# Patient Record
Sex: FEMALE | Race: WHITE | Employment: FULL TIME | URBAN - METROPOLITAN AREA
[De-identification: names, ages, dates, MRNs, and addresses within clinical notes are randomized per-mention and may not be internally consistent; named-entity substitution may affect disease eponyms.]

---

## 2022-01-23 ENCOUNTER — HOSPITAL ENCOUNTER (OUTPATIENT)
Age: 37
Discharge: HOME | End: 2022-01-23
Attending: OBSTETRICS & GYNECOLOGY | Admitting: OBSTETRICS & GYNECOLOGY
Payer: COMMERCIAL

## 2022-01-23 VITALS
RESPIRATION RATE: 16 BRPM | BODY MASS INDEX: 38.14 KG/M2 | TEMPERATURE: 98.3 F | HEIGHT: 61 IN | SYSTOLIC BLOOD PRESSURE: 128 MMHG | WEIGHT: 202 LBS | HEART RATE: 97 BPM | DIASTOLIC BLOOD PRESSURE: 86 MMHG

## 2022-01-23 PROBLEM — Z34.90 PREGNANT: Status: ACTIVE | Noted: 2022-01-23

## 2022-01-23 LAB
APPEARANCE UR: CLEAR
BILIRUB UR QL: NEGATIVE
COLOR UR: YELLOW
GLUCOSE UR QL STRIP.AUTO: NEGATIVE MG/DL
KETONES UR-MCNC: NEGATIVE MG/DL
LEUKOCYTE ESTERASE UR QL STRIP: NEGATIVE
NITRITE UR QL: NEGATIVE
PH UR: 5.5 [PH] (ref 5–9)
PROT UR QL: NEGATIVE MG/DL
RBC # UR STRIP: ABNORMAL /UL
SERVICE CMNT-IMP: ABNORMAL
SP GR UR: 1.02 (ref 1–1.02)
UROBILINOGEN UR QL: 0.2 EU/DL (ref 0.2–1)

## 2022-01-23 PROCEDURE — 59025 FETAL NON-STRESS TEST: CPT

## 2022-01-23 PROCEDURE — 81003 URINALYSIS AUTO W/O SCOPE: CPT

## 2022-01-23 PROCEDURE — 99282 EMERGENCY DEPT VISIT SF MDM: CPT

## 2022-01-23 RX ORDER — LEVOTHYROXINE SODIUM 112 UG/1
TABLET ORAL
COMMUNITY

## 2022-01-23 NOTE — DISCHARGE INSTRUCTIONS
Patient Education   Patient Education   Patient Education        Post-Term Pregnancy: Care Instructions  Overview     Most pregnancies last 37 to 42 weeks. Your pregnancy is post-term (or post-date) when you are at 43 or more weeks. When you get to 40 weeks, your doctor will look at your health and the baby's health and decide whether to wait for natural labor. You may have tests to make sure everything is okay. If you and the baby have no problems, you may be told to wait for natural labor. But if the doctor thinks it would be safer for you and your baby if you deliver sooner rather than later, then you may get medicine to start (induce) your labor. If the medicine doesn't start your labor or labor doesn't keep going, you may have a  section (). This is surgery to deliver the baby. Follow-up care is a key part of your treatment and safety. Be sure to make and go to all appointments, and call your doctor if you are having problems. It's also a good idea to know your test results and keep a list of the medicines you take. How can you care for yourself at home? · Follow your doctor's directions for activity while at home. · Get lots of rest, eat well, and make sure to drink plenty of fluids. When should you call for help? Call 911 anytime you think you may need emergency care. For example, call if:    · You passed out (lost consciousness).     · You have severe vaginal bleeding. Call your doctor now or seek immediate medical care if:    · You have any vaginal bleeding.     · You have pain in your belly or pelvis.     · You think that you are in labor.     · You have a sudden release of fluid from your vagina.     · You notice that your baby has stopped moving or is moving much less than normal.   Watch closely for changes in your health, and be sure to contact your doctor if you have any questions or concerns. Where can you learn more?   Go to http://www.gray.com/  Enter F375 in the search box to learn more about \"Post-Term Pregnancy: Care Instructions. \"  Current as of: June 16, 2021               Content Version: 13.0  © 1516-5751 "[employer]". Care instructions adapted under license by [employer] (which disclaims liability or warranty for this information). If you have questions about a medical condition or this instruction, always ask your healthcare professional. [address] any warranty or liability for your use of this information. Counting Your Baby's Kicks: Care Instructions  Overview     Counting your baby's kicks is one way your doctor can tell that your baby is healthy. Most women--especially in a first pregnancy--feel their baby move for the first time between 16 and 22 weeks. The movement may feel like flutters rather than kicks. Your baby may move more at certain times of the day. When you are active, you may notice less kicking than when you are resting. At your prenatal visits, your doctor will ask whether the baby is active. In your last trimester, your doctor may ask you to count the number of times you feel your baby move. Follow-up care is a key part of your treatment and safety. Be sure to make and go to all appointments, and call your doctor if you are having problems. It's also a good idea to know your test results and keep a list of the medicines you take. How do you count fetal kicks? · A common method of checking your baby's movement is to note the length of time it takes to count ten movements (such as kicks, flutters, or rolls). · Pick your baby's most active time of day to count. This may be any time from morning to evening. · If you don't feel 10 movements in an hour, have something to eat or drink and count for another hour. If you don't feel at least 10 movements in the 2-hour period, call your doctor.   When should you call for help?   Call your doctor now or seek immediate medical care if:    · You noticed that your baby has stopped moving or is moving much less than normal.   Watch closely for changes in your health, and be sure to contact your doctor if you have any problems. Where can you learn more? Go to http://www.gray.com/  Enter [id number] in the search box to learn more about \"Counting Your Baby's Kicks: Care Instructions. \"  Current as of: 2021               Content Version: 13.0  © 3074-9695 [employer]. Care instructions adapted under license by [employer] (which disclaims liability or warranty for this information). If you have questions about a medical condition or this instruction, always ask your healthcare professional. [address] any warranty or liability for your use of this information. Pregnancy Precautions: Care Instructions  Your Care Instructions     There is no sure way to prevent labor before your due date ( labor) or to prevent most other pregnancy problems. But there are things you can do to increase your chances of a healthy pregnancy. Go to your appointments, follow your doctor's advice, and take good care of yourself. Eat well, and exercise (if your doctor agrees). And make sure to drink plenty of water. Follow-up care is a key part of your treatment and safety. Be sure to make and go to all appointments, and call your doctor if you are having problems. It's also a good idea to know your test results and keep a list of the medicines you take. How can you care for yourself at home? · Make sure you go to your prenatal appointments. At each visit, your doctor will check your blood pressure. Your doctor will also check to see if you have protein in your urine. High blood pressure and protein in urine are signs of preeclampsia. This condition can be dangerous for you and your baby. · Drink plenty of fluids. Dehydration can cause contractions. If you have kidney, heart, or liver disease and have to limit fluids, talk with your doctor before you increase the amount of fluids you drink. · Tell your doctor right away if you notice any symptoms of an infection, such as:  ? Burning when you urinate. ? A foul-smelling discharge from your vagina. ? Vaginal itching. ? Unexplained fever. ? Unusual pain or soreness in your uterus or lower belly. · Eat a balanced diet. Include plenty of foods that are high in calcium and iron. ? Foods high in calcium include milk, cheese, yogurt, almonds, and broccoli. ? Foods high in iron include red meat, shellfish, poultry, eggs, beans, raisins, whole-grain bread, and leafy green vegetables. · Do not smoke. If you need help quitting, talk to your doctor about stop-smoking programs and medicines. These can increase your chances of quitting for good. · Do not drink alcohol or use marijuana or illegal drugs. · Follow your doctor's directions about activity. Your doctor will let you know how much, if any, exercise you can do. · Ask your doctor if you can have sex. If you are at risk for early labor, your doctor may ask you to not have sex. · Take care to prevent falls. During pregnancy, your joints are loose, and your balance is off. Sports such as bicycling, skiing, or in-line skating can increase your risk of falling. And don't ride horses or motorcycles, dive, water ski, scuba dive, or parachute jump while you are pregnant. · Avoid getting very hot. Do not use saunas or hot tubs. Avoid staying out in the sun in hot weather for long periods. Take acetaminophen (Tylenol) to lower a high fever. · Do not take any over-the-counter or herbal medicines or supplements without talking to your doctor or pharmacist first.  When should you call for help? Call 911  anytime you think you may need emergency care.  For example, call if:    · You passed out (lost consciousness).     · You have a seizure.     · You have severe vaginal bleeding.     · You have severe pain in your belly or pelvis.     · You have had fluid gushing or leaking from your vagina and you know or think the umbilical cord is bulging into your vagina. If this happens, immediately get down on your knees so your rear end (buttocks) is higher than your head. This will decrease the pressure on the cord until help arrives. Call your doctor now or seek immediate medical care if:    · You have signs of preeclampsia, such as:  ? Sudden swelling of your face, hands, or feet. ? New vision problems (such as dimness, blurring, or seeing spots). ? A severe headache.     · You have any vaginal bleeding.     · You have belly pain or cramping.     · You have a fever.     · You have had regular contractions (with or without pain) for an hour. This means that you have 8 or more within 1 hour or 4 or more in 20 minutes after you change your position and drink fluids.     · You have a sudden release of fluid from your vagina.     · You have low back pain or pelvic pressure that does not go away.     · You notice that your baby has stopped moving or is moving much less than normal.   Watch closely for changes in your health, and be sure to contact your doctor if you have any problems. Where can you learn more? Go to http://www.[employer].com/  Enter Y951 in the search box to learn more about \"Pregnancy Precautions: Care Instructions. \"  Current as of: June 16, 2021               Content Version: 13.0  © 6131-1477 [employer]. Care instructions adapted under license by [employer] (which disclaims liability or warranty for this information). If you have questions about a medical condition or this instruction, always ask your healthcare professional. [place] [address] any warranty or liability for your use of this information.

## 2022-01-23 NOTE — PROGRESS NOTES
HPI:    Subjective: here for labor check, reports good FM, denies VB or LOF, scheduled for IOL on Thursday     Neli Lombardo, a 43 y.o. [place] G1 at 40w6d presents to L&D with c/o see above. Assessment:  Past Medical History:   Diagnosis Date    Beta thalassemia trait     Heart abnormality     pt states \"prolapse of heart valve, on record\"    Thyroid activity decreased      History reviewed. No pertinent surgical history. Allergies   Allergen Reactions    Salicylic Acid Swelling     Prior to Admission medications    Medication Sig Start Date End Date Taking? Authorizing Provider   levothyroxine (Synthroid) 112 mcg tablet Take  by mouth Daily (before breakfast). Yes Provider, Historical   PNV Comb #2-Iron-Omega 3-FA [id number] mg cmpk Take  by mouth. Yes Provider, Historical        Objective:     Vitals:  Vitals:    01/23/22 0456   BP: 128/86   Pulse: 97   Resp: 16   Temp: 98.3 °F (36.8 °C)   Weight: 91.6 kg (202 lb)   Height: 5' 1\" (1.549 m)        Physical Exam:  Patient without distress. Membranes:  Intact  Fetal Heart Rate: Cat I fetal tracing   Baseline: 140 per minute  Variability: moderate  Accelerations: yes  Decelerations: none (one isolated variable deceleration  Uterine contractions: irregular, every 2-5 minutes lasting 30-45 sec  Cervical exam: Dilated:  1 cm                             Effacement: 50%                             Station: -2  U/A: Urine dipstick shows positive for red blood cells. S.g. 1.020    Assessment/Plan: false labor vs early labor, reassuring fetal status     Plan: DC home with standard & ER labor precautions. Follow-up in office for routine visit. Offered IOL for 41+0 wga - does not want at this time.     Signed By:  Andrzej Yang CNM     January 23, 2022

## 2022-01-23 NOTE — PROGRESS NOTES
4982 Pt is a 43 y.o.  at 40w6d, arrived to L&D triage with c/o of contractions since  yesterday increasing in frequency and intensity. Pt denies bleeding or leaking fluid and states positive fetal movement. EFM and TOCO applied, call bell within reach. 0545 L. Graham Heater on unit sbar report given  3707 L. Graham Heater at bedside. EFM tracing reviewed by provider. Discharge orders received. 5529 Discharge instructions reviewed and opportunity for questions provided. Pt verbalized understanding. Pt discharged to home in stable condition and delivered to the ED exit.

## 2022-01-24 ENCOUNTER — ANESTHESIA EVENT (OUTPATIENT)
Dept: LABOR AND DELIVERY | Age: 37
End: 2022-01-24
Payer: COMMERCIAL

## 2022-01-24 ENCOUNTER — ANESTHESIA (OUTPATIENT)
Dept: LABOR AND DELIVERY | Age: 37
End: 2022-01-24
Payer: COMMERCIAL

## 2022-01-24 ENCOUNTER — HOSPITAL ENCOUNTER (INPATIENT)
Age: 37
LOS: 3 days | Discharge: HOME | End: 2022-01-27
Attending: OBSTETRICS & GYNECOLOGY | Admitting: OBSTETRICS & GYNECOLOGY
Payer: COMMERCIAL

## 2022-01-24 PROBLEM — Z34.90 TERM PREGNANCY: Status: ACTIVE | Noted: 2022-01-24

## 2022-01-24 LAB
ABO + RH BLD: NORMAL
APPEARANCE UR: CLEAR
BASOPHILS # BLD: 0 K/UL (ref 0–0.1)
BASOPHILS NFR BLD: 0 % (ref 0–2)
BILIRUB UR QL: NEGATIVE
BLOOD GROUP ANTIBODIES SERPL: NORMAL
COLOR UR: YELLOW
DIFFERENTIAL METHOD BLD: ABNORMAL
EOSINOPHIL # BLD: 0.1 K/UL (ref 0–0.4)
EOSINOPHIL NFR BLD: 1 % (ref 0–5)
ERYTHROCYTE [DISTWIDTH] IN BLOOD BY AUTOMATED COUNT: 17.6 % (ref 11.6–14.5)
GLUCOSE UR QL STRIP.AUTO: NEGATIVE MG/DL
HCT VFR BLD AUTO: 35 % (ref 35–45)
HGB BLD-MCNC: 10.7 G/DL (ref 12–16)
IMM GRANULOCYTES # BLD AUTO: 0.1 K/UL (ref 0–0.04)
IMM GRANULOCYTES NFR BLD AUTO: 1 % (ref 0–0.5)
KETONES UR-MCNC: NEGATIVE MG/DL
LEUKOCYTE ESTERASE UR QL STRIP: ABNORMAL
LYMPHOCYTES # BLD: 1.8 K/UL (ref 0.9–3.6)
LYMPHOCYTES NFR BLD: 14 % (ref 21–52)
MCH RBC QN AUTO: 18.4 PG (ref 24–34)
MCHC RBC AUTO-ENTMCNC: 30.6 G/DL (ref 31–37)
MCV RBC AUTO: 60.1 FL (ref 78–100)
MONOCYTES # BLD: 1.5 K/UL (ref 0.05–1.2)
MONOCYTES NFR BLD: 12 % (ref 3–10)
NEUTS SEG # BLD: 9.2 K/UL (ref 1.8–8)
NEUTS SEG NFR BLD: 73 % (ref 40–73)
NITRITE UR QL: NEGATIVE
NRBC # BLD: 0 K/UL (ref 0–0.01)
NRBC BLD-RTO: 0 PER 100 WBC
PH UR: 5 [PH] (ref 5–9)
PLATELET # BLD AUTO: 230 K/UL (ref 135–420)
PROT UR QL: ABNORMAL MG/DL
RBC # BLD AUTO: 5.82 M/UL (ref 4.2–5.3)
RBC # UR STRIP: ABNORMAL /UL
SERVICE CMNT-IMP: ABNORMAL
SP GR UR: >1.03 (ref 1–1.02)
SPECIMEN EXP DATE BLD: NORMAL
UROBILINOGEN UR QL: 0.2 EU/DL (ref 0.2–1)
WBC # BLD AUTO: 12.6 K/UL (ref 4.6–13.2)

## 2022-01-24 PROCEDURE — 74011250636 HC RX REV CODE- 250/636: Performed by: ADVANCED PRACTICE MIDWIFE

## 2022-01-24 PROCEDURE — 74011250636 HC RX REV CODE- 250/636: Performed by: OBSTETRICS & GYNECOLOGY

## 2022-01-24 PROCEDURE — 10H07YZ INSERTION OF OTHER DEVICE INTO PRODUCTS OF CONCEPTION, VIA NATURAL OR ARTIFICIAL OPENING: ICD-10-PCS | Performed by: OBSTETRICS & GYNECOLOGY

## 2022-01-24 PROCEDURE — 74011250636 HC RX REV CODE- 250/636: Performed by: MIDWIFE

## 2022-01-24 PROCEDURE — 74011250637 HC RX REV CODE- 250/637: Performed by: ADVANCED PRACTICE MIDWIFE

## 2022-01-24 PROCEDURE — 76060000032 HC ANESTHESIA 0.5 TO 1 HR: Performed by: OBSTETRICS & GYNECOLOGY

## 2022-01-24 PROCEDURE — 2709999900 HC NON-CHARGEABLE SUPPLY: Performed by: OBSTETRICS & GYNECOLOGY

## 2022-01-24 PROCEDURE — 85025 COMPLETE CBC W/AUTO DIFF WBC: CPT

## 2022-01-24 PROCEDURE — 76010000391 HC C SECN FIRST 1 HR: Performed by: OBSTETRICS & GYNECOLOGY

## 2022-01-24 PROCEDURE — 77030011265 HC ELECTRD BLD HEX COVD -A: Performed by: OBSTETRICS & GYNECOLOGY

## 2022-01-24 PROCEDURE — 99281 EMR DPT VST MAYX REQ PHY/QHP: CPT

## 2022-01-24 PROCEDURE — 74011000250 HC RX REV CODE- 250: Performed by: NURSE ANESTHETIST, CERTIFIED REGISTERED

## 2022-01-24 PROCEDURE — 74011000258 HC RX REV CODE- 258: Performed by: ADVANCED PRACTICE MIDWIFE

## 2022-01-24 PROCEDURE — 77030031139 HC SUT VCRL2 J&J -A: Performed by: OBSTETRICS & GYNECOLOGY

## 2022-01-24 PROCEDURE — 65270000029 HC RM PRIVATE

## 2022-01-24 PROCEDURE — 59020 FETAL CONTRACT STRESS TEST: CPT

## 2022-01-24 PROCEDURE — 74011250636 HC RX REV CODE- 250/636: Performed by: NURSE ANESTHETIST, CERTIFIED REGISTERED

## 2022-01-24 PROCEDURE — 74011000258 HC RX REV CODE- 258: Performed by: OBSTETRICS & GYNECOLOGY

## 2022-01-24 PROCEDURE — 75410000002 HC LABOR FEE PER 1 HR

## 2022-01-24 PROCEDURE — 77030002933 HC SUT MCRYL J&J -A: Performed by: OBSTETRICS & GYNECOLOGY

## 2022-01-24 PROCEDURE — 77030040361 HC SLV COMPR DVT MDII -B: Performed by: OBSTETRICS & GYNECOLOGY

## 2022-01-24 PROCEDURE — 76060000078 HC EPIDURAL ANESTHESIA

## 2022-01-24 PROCEDURE — 86900 BLOOD TYPING SEROLOGIC ABO: CPT

## 2022-01-24 PROCEDURE — 81003 URINALYSIS AUTO W/O SCOPE: CPT

## 2022-01-24 PROCEDURE — 77030010848 HC CATH INTUTR PRSS KOLB -B

## 2022-01-24 PROCEDURE — 75410000003 HC RECOV DEL/VAG/CSECN EA 0.5 HR

## 2022-01-24 PROCEDURE — 74011250637 HC RX REV CODE- 250/637: Performed by: MIDWIFE

## 2022-01-24 PROCEDURE — 77030010507 HC ADH SKN DERMBND J&J -B: Performed by: OBSTETRICS & GYNECOLOGY

## 2022-01-24 PROCEDURE — 75410000003 HC RECOV DEL/VAG/CSECN EA 0.5 HR: Performed by: OBSTETRICS & GYNECOLOGY

## 2022-01-24 PROCEDURE — 77030007879 HC KT SPN EPDRL TELE -B: Performed by: NURSE ANESTHETIST, CERTIFIED REGISTERED

## 2022-01-24 RX ORDER — OXYTOCIN/RINGER'S LACTATE 30/500 ML
10 PLASTIC BAG, INJECTION (ML) INTRAVENOUS AS NEEDED
Status: DISCONTINUED | OUTPATIENT
Start: 2022-01-24 | End: 2022-01-27 | Stop reason: HOSPADM

## 2022-01-24 RX ORDER — ONDANSETRON 2 MG/ML
INJECTION INTRAMUSCULAR; INTRAVENOUS AS NEEDED
Status: DISCONTINUED | OUTPATIENT
Start: 2022-01-24 | End: 2022-01-24 | Stop reason: HOSPADM

## 2022-01-24 RX ORDER — NALOXONE HYDROCHLORIDE 0.4 MG/ML
0.4 INJECTION, SOLUTION INTRAMUSCULAR; INTRAVENOUS; SUBCUTANEOUS
Status: ACTIVE | OUTPATIENT
Start: 2022-01-24 | End: 2022-01-25

## 2022-01-24 RX ORDER — ACETAMINOPHEN 325 MG/1
650 TABLET ORAL
Status: DISCONTINUED | OUTPATIENT
Start: 2022-01-24 | End: 2022-01-27 | Stop reason: HOSPADM

## 2022-01-24 RX ORDER — SODIUM CHLORIDE, SODIUM LACTATE, POTASSIUM CHLORIDE, CALCIUM CHLORIDE 600; 310; 30; 20 MG/100ML; MG/100ML; MG/100ML; MG/100ML
125 INJECTION, SOLUTION INTRAVENOUS CONTINUOUS
Status: DISPENSED | OUTPATIENT
Start: 2022-01-24 | End: 2022-01-25

## 2022-01-24 RX ORDER — OXYTOCIN/0.9 % SODIUM CHLORIDE 30/500 ML
0-20 PLASTIC BAG, INJECTION (ML) INTRAVENOUS
Status: DISPENSED | OUTPATIENT
Start: 2022-01-24 | End: 2022-01-25

## 2022-01-24 RX ORDER — CETIRIZINE HCL 10 MG
10 TABLET ORAL
Status: DISCONTINUED | OUTPATIENT
Start: 2022-01-24 | End: 2022-01-27 | Stop reason: HOSPADM

## 2022-01-24 RX ORDER — MORPHINE SULFATE 1 MG/ML
INJECTION, SOLUTION EPIDURAL; INTRATHECAL; INTRAVENOUS AS NEEDED
Status: DISCONTINUED | OUTPATIENT
Start: 2022-01-24 | End: 2022-01-24 | Stop reason: HOSPADM

## 2022-01-24 RX ORDER — METHYLERGONOVINE MALEATE 0.2 MG/ML
INJECTION INTRAVENOUS AS NEEDED
Status: DISCONTINUED | OUTPATIENT
Start: 2022-01-24 | End: 2022-01-24 | Stop reason: HOSPADM

## 2022-01-24 RX ORDER — ONDANSETRON 2 MG/ML
4 INJECTION INTRAMUSCULAR; INTRAVENOUS
Status: DISCONTINUED | OUTPATIENT
Start: 2022-01-24 | End: 2022-01-27 | Stop reason: HOSPADM

## 2022-01-24 RX ORDER — SODIUM CHLORIDE 0.9 % (FLUSH) 0.9 %
5-40 SYRINGE (ML) INJECTION AS NEEDED
Status: DISCONTINUED | OUTPATIENT
Start: 2022-01-24 | End: 2022-01-25 | Stop reason: HOSPADM

## 2022-01-24 RX ORDER — SODIUM CHLORIDE 0.9 % (FLUSH) 0.9 %
5-40 SYRINGE (ML) INJECTION EVERY 8 HOURS
Status: DISCONTINUED | OUTPATIENT
Start: 2022-01-24 | End: 2022-01-25 | Stop reason: HOSPADM

## 2022-01-24 RX ORDER — MINERAL OIL
30 OIL (ML) ORAL AS NEEDED
Status: DISCONTINUED | OUTPATIENT
Start: 2022-01-24 | End: 2022-01-25 | Stop reason: HOSPADM

## 2022-01-24 RX ORDER — FACIAL-BODY WIPES
10 EACH TOPICAL
Status: DISCONTINUED | OUTPATIENT
Start: 2022-01-24 | End: 2022-01-27 | Stop reason: HOSPADM

## 2022-01-24 RX ORDER — LIDOCAINE HYDROCHLORIDE 10 MG/ML
20 INJECTION, SOLUTION EPIDURAL; INFILTRATION; INTRACAUDAL; PERINEURAL AS NEEDED
Status: DISCONTINUED | OUTPATIENT
Start: 2022-01-24 | End: 2022-01-25 | Stop reason: HOSPADM

## 2022-01-24 RX ORDER — LIDOCAINE HYDROCHLORIDE AND EPINEPHRINE 15; 5 MG/ML; UG/ML
INJECTION, SOLUTION EPIDURAL AS NEEDED
Status: DISCONTINUED | OUTPATIENT
Start: 2022-01-24 | End: 2022-01-24 | Stop reason: HOSPADM

## 2022-01-24 RX ORDER — ACETAMINOPHEN 500 MG
1000 TABLET ORAL ONCE
Status: COMPLETED | OUTPATIENT
Start: 2022-01-24 | End: 2022-01-24

## 2022-01-24 RX ORDER — TERBUTALINE SULFATE 1 MG/ML
0.25 INJECTION SUBCUTANEOUS
Status: DISCONTINUED | OUTPATIENT
Start: 2022-01-24 | End: 2022-01-25 | Stop reason: HOSPADM

## 2022-01-24 RX ORDER — SODIUM CHLORIDE, SODIUM LACTATE, POTASSIUM CHLORIDE, CALCIUM CHLORIDE 600; 310; 30; 20 MG/100ML; MG/100ML; MG/100ML; MG/100ML
125 INJECTION, SOLUTION INTRAVENOUS CONTINUOUS
Status: DISCONTINUED | OUTPATIENT
Start: 2022-01-24 | End: 2022-01-25 | Stop reason: HOSPADM

## 2022-01-24 RX ORDER — ROPIVACAINE IN 0.9% SOD CHL/PF 0.2 %
PLASTIC BAG, INJECTION (ML) EPIDURAL AS NEEDED
Status: DISCONTINUED | OUTPATIENT
Start: 2022-01-24 | End: 2022-01-24 | Stop reason: HOSPADM

## 2022-01-24 RX ORDER — PHENYLEPHRINE HCL IN 0.9% NACL 1 MG/10 ML
100 SYRINGE (ML) INTRAVENOUS AS NEEDED
Status: DISCONTINUED | OUTPATIENT
Start: 2022-01-24 | End: 2022-01-25 | Stop reason: HOSPADM

## 2022-01-24 RX ORDER — OXYCODONE AND ACETAMINOPHEN 5; 325 MG/1; MG/1
1-2 TABLET ORAL
Status: DISCONTINUED | OUTPATIENT
Start: 2022-01-24 | End: 2022-01-27 | Stop reason: HOSPADM

## 2022-01-24 RX ORDER — OXYTOCIN/0.9 % SODIUM CHLORIDE 30/500 ML
87.3 PLASTIC BAG, INJECTION (ML) INTRAVENOUS AS NEEDED
Status: COMPLETED | OUTPATIENT
Start: 2022-01-24 | End: 2022-01-24

## 2022-01-24 RX ORDER — SODIUM CHLORIDE 0.9 % (FLUSH) 0.9 %
5-40 SYRINGE (ML) INJECTION AS NEEDED
Status: DISCONTINUED | OUTPATIENT
Start: 2022-01-24 | End: 2022-01-27 | Stop reason: HOSPADM

## 2022-01-24 RX ORDER — DIPHENHYDRAMINE HYDROCHLORIDE 50 MG/ML
25 INJECTION, SOLUTION INTRAMUSCULAR; INTRAVENOUS
Status: DISCONTINUED | OUTPATIENT
Start: 2022-01-24 | End: 2022-01-27 | Stop reason: HOSPADM

## 2022-01-24 RX ORDER — FENTANYL/ROPIVACAINE/NS/PF 2MCG/ML-.1
1-15 PLASTIC BAG, INJECTION (ML) EPIDURAL
Status: DISCONTINUED | OUTPATIENT
Start: 2022-01-24 | End: 2022-01-25 | Stop reason: HOSPADM

## 2022-01-24 RX ORDER — LIDOCAINE HYDROCHLORIDE 10 MG/ML
INJECTION INFILTRATION; PERINEURAL AS NEEDED
Status: DISCONTINUED | OUTPATIENT
Start: 2022-01-24 | End: 2022-01-24 | Stop reason: HOSPADM

## 2022-01-24 RX ORDER — METHYLERGONOVINE MALEATE 0.2 MG/ML
0.2 INJECTION INTRAVENOUS AS NEEDED
Status: DISCONTINUED | OUTPATIENT
Start: 2022-01-24 | End: 2022-01-25 | Stop reason: HOSPADM

## 2022-01-24 RX ORDER — NALBUPHINE HYDROCHLORIDE 10 MG/ML
5 INJECTION, SOLUTION INTRAMUSCULAR; INTRAVENOUS; SUBCUTANEOUS
Status: ACTIVE | OUTPATIENT
Start: 2022-01-24 | End: 2022-01-25

## 2022-01-24 RX ORDER — SIMETHICONE 80 MG
80 TABLET,CHEWABLE ORAL
Status: DISCONTINUED | OUTPATIENT
Start: 2022-01-24 | End: 2022-01-27 | Stop reason: HOSPADM

## 2022-01-24 RX ORDER — FENTANYL/ROPIVACAINE/NS/PF 2MCG/ML-.1
PLASTIC BAG, INJECTION (ML) EPIDURAL
Status: DISPENSED
Start: 2022-01-24 | End: 2022-01-24

## 2022-01-24 RX ORDER — PROMETHAZINE HYDROCHLORIDE 25 MG/ML
25 INJECTION, SOLUTION INTRAMUSCULAR; INTRAVENOUS
Status: DISCONTINUED | OUTPATIENT
Start: 2022-01-24 | End: 2022-01-27 | Stop reason: HOSPADM

## 2022-01-24 RX ORDER — LIDOCAINE HYDROCHLORIDE AND EPINEPHRINE 20; 5 MG/ML; UG/ML
INJECTION, SOLUTION EPIDURAL; INFILTRATION; INTRACAUDAL; PERINEURAL AS NEEDED
Status: DISCONTINUED | OUTPATIENT
Start: 2022-01-24 | End: 2022-01-24 | Stop reason: HOSPADM

## 2022-01-24 RX ORDER — OXYTOCIN/0.9 % SODIUM CHLORIDE 30/500 ML
87.3 PLASTIC BAG, INJECTION (ML) INTRAVENOUS AS NEEDED
Status: DISCONTINUED | OUTPATIENT
Start: 2022-01-24 | End: 2022-01-27 | Stop reason: HOSPADM

## 2022-01-24 RX ORDER — BUTORPHANOL TARTRATE 2 MG/ML
2 INJECTION INTRAMUSCULAR; INTRAVENOUS
Status: DISCONTINUED | OUTPATIENT
Start: 2022-01-24 | End: 2022-01-25 | Stop reason: HOSPADM

## 2022-01-24 RX ORDER — HYDROMORPHONE HYDROCHLORIDE 1 MG/ML
1 INJECTION, SOLUTION INTRAMUSCULAR; INTRAVENOUS; SUBCUTANEOUS
Status: DISCONTINUED | OUTPATIENT
Start: 2022-01-24 | End: 2022-01-25 | Stop reason: HOSPADM

## 2022-01-24 RX ORDER — ONDANSETRON 2 MG/ML
4 INJECTION INTRAMUSCULAR; INTRAVENOUS
Status: DISCONTINUED | OUTPATIENT
Start: 2022-01-24 | End: 2022-01-25 | Stop reason: HOSPADM

## 2022-01-24 RX ORDER — SODIUM CHLORIDE, SODIUM LACTATE, POTASSIUM CHLORIDE, CALCIUM CHLORIDE 600; 310; 30; 20 MG/100ML; MG/100ML; MG/100ML; MG/100ML
100 INJECTION, SOLUTION INTRAVENOUS CONTINUOUS
Status: DISCONTINUED | OUTPATIENT
Start: 2022-01-24 | End: 2022-01-27 | Stop reason: HOSPADM

## 2022-01-24 RX ORDER — CLINDAMYCIN PHOSPHATE 900 MG/50ML
900 INJECTION, SOLUTION INTRAVENOUS EVERY 8 HOURS
Status: DISCONTINUED | OUTPATIENT
Start: 2022-01-24 | End: 2022-01-26

## 2022-01-24 RX ORDER — EPHEDRINE SULFATE/0.9% NACL/PF 50 MG/5 ML
10 SYRINGE (ML) INTRAVENOUS AS NEEDED
Status: DISCONTINUED | OUTPATIENT
Start: 2022-01-24 | End: 2022-01-25 | Stop reason: HOSPADM

## 2022-01-24 RX ORDER — PHENYLEPHRINE HCL IN 0.9% NACL 1 MG/10 ML
SYRINGE (ML) INTRAVENOUS AS NEEDED
Status: DISCONTINUED | OUTPATIENT
Start: 2022-01-24 | End: 2022-01-24 | Stop reason: HOSPADM

## 2022-01-24 RX ORDER — OXYTOCIN/RINGER'S LACTATE 30/500 ML
10 PLASTIC BAG, INJECTION (ML) INTRAVENOUS AS NEEDED
Status: DISCONTINUED | OUTPATIENT
Start: 2022-01-24 | End: 2022-01-25 | Stop reason: HOSPADM

## 2022-01-24 RX ORDER — SODIUM CHLORIDE 0.9 % (FLUSH) 0.9 %
5-40 SYRINGE (ML) INJECTION EVERY 8 HOURS
Status: DISCONTINUED | OUTPATIENT
Start: 2022-01-24 | End: 2022-01-27 | Stop reason: HOSPADM

## 2022-01-24 RX ORDER — LANOLIN ALCOHOL/MO/W.PET/CERES
3 CREAM (GRAM) TOPICAL
Status: DISCONTINUED | OUTPATIENT
Start: 2022-01-24 | End: 2022-01-27 | Stop reason: HOSPADM

## 2022-01-24 RX ADMIN — LIDOCAINE HYDROCHLORIDE,EPINEPHRINE BITARTRATE 2 ML: 20; .005 INJECTION, SOLUTION EPIDURAL; INFILTRATION; INTRACAUDAL; PERINEURAL at 21:03

## 2022-01-24 RX ADMIN — OXYCODONE AND ACETAMINOPHEN 1 TABLET: 5; 325 TABLET ORAL at 22:20

## 2022-01-24 RX ADMIN — ROPIVACAINE HYDROCHLORIDE 12 ML/HR: 5 INJECTION, SOLUTION EPIDURAL; INFILTRATION; PERINEURAL at 17:17

## 2022-01-24 RX ADMIN — Medication 5 ML: at 04:30

## 2022-01-24 RX ADMIN — Medication 36 UNITS/HR: at 21:08

## 2022-01-24 RX ADMIN — METHYLERGONOVINE MALEATE 0.2 MG: 0.2 INJECTION, SOLUTION INTRAMUSCULAR; INTRAVENOUS at 21:17

## 2022-01-24 RX ADMIN — LIDOCAINE HYDROCHLORIDE,EPINEPHRINE BITARTRATE 3 ML: 15; .005 INJECTION, SOLUTION EPIDURAL; INFILTRATION; INTRACAUDAL; PERINEURAL at 04:25

## 2022-01-24 RX ADMIN — SODIUM CHLORIDE, POTASSIUM CHLORIDE, SODIUM LACTATE AND CALCIUM CHLORIDE 125 ML/HR: 600; 310; 30; 20 INJECTION, SOLUTION INTRAVENOUS at 04:00

## 2022-01-24 RX ADMIN — LIDOCAINE HYDROCHLORIDE,EPINEPHRINE BITARTRATE 5 ML: 20; .005 INJECTION, SOLUTION EPIDURAL; INFILTRATION; INTRACAUDAL; PERINEURAL at 20:50

## 2022-01-24 RX ADMIN — ROPIVACAINE HYDROCHLORIDE 12 ML/HR: 5 INJECTION, SOLUTION EPIDURAL; INFILTRATION; PERINEURAL at 04:27

## 2022-01-24 RX ADMIN — SODIUM CHLORIDE, POTASSIUM CHLORIDE, SODIUM LACTATE AND CALCIUM CHLORIDE 125 ML/HR: 600; 310; 30; 20 INJECTION, SOLUTION INTRAVENOUS at 13:39

## 2022-01-24 RX ADMIN — SODIUM CHLORIDE, POTASSIUM CHLORIDE, SODIUM LACTATE AND CALCIUM CHLORIDE 125 ML/HR: 600; 310; 30; 20 INJECTION, SOLUTION INTRAVENOUS at 08:39

## 2022-01-24 RX ADMIN — Medication 2 MILLI-UNITS/MIN: at 08:10

## 2022-01-24 RX ADMIN — LIDOCAINE HYDROCHLORIDE,EPINEPHRINE BITARTRATE 5 ML: 20; .005 INJECTION, SOLUTION EPIDURAL; INFILTRATION; INTRACAUDAL; PERINEURAL at 20:46

## 2022-01-24 RX ADMIN — CLINDAMYCIN PHOSPHATE 900 MG: 900 INJECTION, SOLUTION INTRAVENOUS at 18:25

## 2022-01-24 RX ADMIN — SODIUM CHLORIDE, POTASSIUM CHLORIDE, SODIUM LACTATE AND CALCIUM CHLORIDE 1000 ML: 600; 310; 30; 20 INJECTION, SOLUTION INTRAVENOUS at 03:00

## 2022-01-24 RX ADMIN — ONDANSETRON HYDROCHLORIDE 4 MG: 2 INJECTION INTRAMUSCULAR; INTRAVENOUS at 21:00

## 2022-01-24 RX ADMIN — LIDOCAINE HYDROCHLORIDE 3 ML: 10 INJECTION, SOLUTION INFILTRATION; PERINEURAL at 04:18

## 2022-01-24 RX ADMIN — Medication 100 MCG: at 21:21

## 2022-01-24 RX ADMIN — AMPICILLIN SODIUM 2 G: 2 INJECTION, POWDER, FOR SOLUTION INTRAMUSCULAR; INTRAVENOUS at 18:47

## 2022-01-24 RX ADMIN — MORPHINE SULFATE 3 MG: 1 INJECTION, SOLUTION EPIDURAL; INTRATHECAL; INTRAVENOUS at 21:18

## 2022-01-24 RX ADMIN — GENTAMICIN SULFATE 240 MG: 40 INJECTION, SOLUTION INTRAMUSCULAR; INTRAVENOUS at 20:24

## 2022-01-24 RX ADMIN — LIDOCAINE HYDROCHLORIDE,EPINEPHRINE BITARTRATE 5 ML: 20; .005 INJECTION, SOLUTION EPIDURAL; INFILTRATION; INTRACAUDAL; PERINEURAL at 20:56

## 2022-01-24 RX ADMIN — Medication 5 ML: at 13:03

## 2022-01-24 RX ADMIN — ACETAMINOPHEN 1000 MG: 500 TABLET ORAL at 18:23

## 2022-01-24 RX ADMIN — SODIUM CHLORIDE, POTASSIUM CHLORIDE, SODIUM LACTATE AND CALCIUM CHLORIDE 500 ML: 600; 310; 30; 20 INJECTION, SOLUTION INTRAVENOUS at 18:56

## 2022-01-24 RX ADMIN — ROPIVACAINE HYDROCHLORIDE 12 ML/HR: 5 INJECTION, SOLUTION EPIDURAL; INFILTRATION; PERINEURAL at 11:49

## 2022-01-24 NOTE — ANESTHESIA PREPROCEDURE EVALUATION
Relevant Problems   No relevant active problems       Anesthetic History   No history of anesthetic complications            Review of Systems / Medical History  Patient summary reviewed, nursing notes reviewed and pertinent labs reviewed    Pulmonary  Within defined limits            Pertinent negatives: No asthma, recent URI, sleep apnea and smoker     Neuro/Psych   Within defined limits        Pertinent negatives: No seizures, neuromuscular disease and CVA   Cardiovascular      Valvular problems/murmurs          Pertinent negatives: No hypertension and dysrhythmias    Comments: Hx mitral valve prolapse, palpitations   GI/Hepatic/Renal     GERD: well controlled        Pertinent negatives: No liver disease and renal disease   Endo/Other      Hypothyroidism  Obesity  Pertinent negatives: No hyperthyroidism and blood dyscrasia   Other Findings   Comments: Beta thalassemia trait         Physical Exam    Airway  Mallampati: III  TM Distance: 4 - 6 cm  Neck ROM: normal range of motion   Mouth opening: Normal     Cardiovascular  Regular rate and rhythm,  S1 and S2 normal,  no murmur, click, rub, or gallop  Rhythm: regular  Rate: normal         Dental  No notable dental hx       Pulmonary  Breath sounds clear to auscultation               Abdominal         Other Findings            Anesthetic Plan    ASA: 2  Anesthesia type: epidural      Post-op pain plan if not by surgeon: indwelling epidural catheter and epidural opioid      Anesthetic plan and risks discussed with: Patient and Spouse

## 2022-01-24 NOTE — PROGRESS NOTES
Labor Progress Note  Patient seen, fetal heart rate and contraction pattern evaluated, patient examined.   Patient Vitals for the past 1 hrs:   BP Pulse SpO2   01/24/22 1535   98 %   01/24/22 1530   99 %   01/24/22 1525   98 %   01/24/22 1520   98 %   01/24/22 1515   99 %   01/24/22 1513 119/70 89    01/24/22 1510   99 %   01/24/22 1505   99 %   01/24/22 1500   99 %       Physical Exam:  Cervical Exam:  8 cm dilated    100% effaced    -1 station    Presenting Part: cephalic  Cervical Position: mid position  Membranes:  leaking clear fluid  Uterine Activity: Frequency: Every 3 minutes, Duration: 80 seconds and Intensity: moderate  Fetal Heart Rate: Baseline: 150 per minute  Variability: moderate  Accelerations: yes  Decelerations: none  Uterine contractions: regular, every 3 minutes    Assessment/Plan:  Reassuring fetal status, Labor  Progressing normally, Continue plan for vaginal delivery

## 2022-01-24 NOTE — ROUTINE PROCESS
0710 Bedside and Verbal shift change report given to YESSICA Villa  (oncoming nurse) by Marysol Mcwilliams, MANDO  (offgoing nurse). Report included the following information SBAR, Kardex, Procedure Summary, Intake/Output, MAR, Accordion and Recent Results. 0725 Pt on left lateral resting with her eyes closed. Pt's family member at bedside. Call bell within reach. 2031 Pt turned on right lateral. With peanut ball in between. US and TOCO adjusted, call bell within.     0900 Pt turned on left lateral with peanut ball in between. US and TOCO adjusted, call bell within reach. 9149 Pt in low fowlers, SVE: 4/85/-2. RN at bedside. [place] care completed. 0925 Pt in Throne's position, US and TOCO adjusted, call bell within reach. 8070 CNM on the unit reviewed the FHR strip. 1030 CNM and RN at bedside. AROM, clear fluids, small amount, no foul smell. 1035 Pt in high fowlers, US adjusted, IUPC zeroed. Call bell within reach. Bed pad changed. Call bell within reach. 1135 Pt turned on left lateral with pillow in between. US adjusted, call bell within reach. 1411 Pt turned on left lateral with pillow in between. US adjusted, call bell within reach. 1247 Paged anesthesia for epidural bolus. 1323 Pt turned all the way on left lateral. US adjusted, call bell within reach. 1335 CNM on the unit reviewed the strip. 1345 Pt in high fowlers, US adjusted, call bell within reach. Pt's family member at bedside. 1411 Pt turned on left lateral with pillow in between. Us adjusted, call bell within reach. 1544 Pt in Throne's position. US adjusted. RN at bedside. [address] at bedside. SVE:8/100/-1    1550 Pt turned on right lateral, with pillow in between. US adjusted, call bell within reach. Pt denies needs at this time. 1620 Pt in semi right lateral with peanut ball in between. US adjusted, call bell within reach. 1720 Pt feels a lot of pressure. SVE: Pt complete.  10/100/-1.    1724 Pt pushing. RN at bedside continuously monitoring FHR and Pt's condition. [address] notified about Pt's temperature. 1806 CNM at the bedside. 1814 Pt in Throne's position, US adjusted, RN at bedside. [address] for epidural bolus. [address] at the bedside. 1844 CNM at bedside. 1852 Pt turned on left lateral with right leg in the stir ups. US adjusted. RN at bedside. 1901 Pt turned on right lateral with left leg in the stir-up. US adjusted. CNM and RN at bedside. 1915 Bedside and Verbal shift change report given to JANAE Calderón RN  (oncoming nurse) by YESSICA No  (offgoing nurse). Report included the following information SBAR, Kardex, Procedure Summary, Intake/Output, MAR, Accordion, Recent Results and Med Rec Status.

## 2022-01-24 NOTE — ANESTHESIA PROCEDURE NOTES
Epidural Block    Patient location during procedure: OB  Start time: 1/24/2022 4:17 AM  End time: 1/24/2022 4:25 AM  Reason for block: labor epidural  Staffing  Performed: CRNA   Resident/CRNA: Vick Parker CRNA  Preanesthetic Checklist  Completed: patient identified, IV checked, site marked, risks and benefits discussed, surgical consent, monitors and equipment checked, pre-op evaluation and timeout performed  Block Placement  Patient position: sitting  Prep: ChloraPrep  Sterility prep: cap, drape, gloves and mask  Sedation level: no sedation  Patient monitoring: continuous pulse oximetry, frequent blood pressure checks and heart rate  Approach: midline  Location: lumbar  Lumbar location: L3-L4  Epidural  Loss of resistance technique: saline  Guidance: landmark technique and ultrasound guided  Needle  Needle type: Tuohy   Needle gauge: 17 G  Needle length: 9 cm  Needle insertion depth: 5.5 cm  Catheter type: end hole  Catheter size: 19 G  Catheter at skin depth: 11 cm  Catheter securement method: clear occlusive dressing and surgical tape  Test dose: negative  Assessment  Block outcome: pain improved  Number of attempts: 1  Procedure assessment: patient tolerated procedure well with no immediate complications

## 2022-01-24 NOTE — H&P
History & Physical    Name: Magnus Person MRN: [id number]  SSN: [id number]    YOB: 1985  Age: 43 y.o. Sex: female        Subjective: painful contractions, denies VB or LOF, reports good fetal movement     Estimated Date of Delivery: 22  OB History        1    Para        Term                AB        Living           SAB        IAB        Ectopic        Molar        Multiple        Live Births                      Ms. Luis Riley, a 42 yo G1, is admitted with pregnancy at 41w0d for Increasingly painful contractions. Prenatal course was complicated by advanced maternal age and beta thalasemia trait, anemia, hypothyroid, excessive weight gain, hx mitral valve prolapse (cleared by cardiology). Please see prenatal records for details. Allergies   Allergen Reactions    Salicylic Acid Swelling       Objective:     Vitals:  Vitals:    22 0102   Weight: 91.6 kg (202 lb)   Height: 5' 1\" (1.549 m)        Physical Exam:  Patient without distress. Heart: Regular rate and rhythm  Lung: clear to auscultation throughout lung fields, no wheezes, no rales, no rhonchi and normal respiratory effort  Abdomen: soft, nontender  Fundus: soft and non tender  Perineum: blood absent, amniotic fluid absent  Cervical Exam: 2 cm dilated    90% effaced    -3 station    Presenting Part: cephalic  Cervical Position: mid position  Consistency: Soft    Membranes:  Intact  Fetal Heart Rate & Contraction pattern: CAT I fetal tracing   Baseline: 135 per minute  Variability: moderate  Accelerations: yes  Decelerations: none  Uterine contractions: regular, every 2-4 minutes    Prenatal Labs:   No results found for: RUBELLAEXT, GRBSEXT, HBSAGEXT, HIVEXT, RPREXT, GONNOEXT, CHLAMEXT      Assessment/Plan: early labor, reassuring fetal status, low risk PPH     Plan: Admit for Reassuring fetal status, Labor  Continue expectant management, Continue plan for vaginal delivery.  Consider cook catheter for cervical ripening, amniotomy for augmentation if indicated  Group B Strep was negative.  Pain management per patient request    Signed By:  Connor Rizzo CNM     January 24, 2022

## 2022-01-24 NOTE — PROGRESS NOTES
Labor Progress Note  Patient seen, fetal heart rate and contraction pattern evaluated, patient examined. Patient Vitals for the past 1 hrs:   BP Pulse SpO2   01/24/22 1056   100 %   01/24/22 1051   99 %   01/24/22 1046   100 %   01/24/22 1043 127/79 88    01/24/22 1041   100 %   01/24/22 1036   100 %   01/24/22 1031   100 %   01/24/22 1026   100 %   01/24/22 1021   99 %   01/24/22 1016   99 %   01/24/22 1013 122/87 81    01/24/22 1011   99 %   01/24/22 1006   100 %       Physical Exam:  Cervical Exam:  5 cm dilated    100% effaced    -2 station    Presenting Part: cephalic  Cervical Position: mid position  Membranes:  BBOW  Uterine Activity: Frequency: Every 3-5 minutes, Duration: 40-80 seconds and Intensity: moderate  Fetal Heart Rate: Baseline: 140 per minute  Variability: moderate  Accelerations: yes  Decelerations: variable and late    Assessment/Plan:  strip reviewed with Dr. Kwame Mims. Plan to AROM.  place IUPC, hold pitocin at this time and continue to watch  jose c

## 2022-01-24 NOTE — PROGRESS NOTES
9632: Pt arrived to unit for c/o cxns that have gotten stronger in intensity and closer together since last night. She is a  at [address] [facility] [place] 0d gestation. GBS negative. Pt taken to triage 1 for evaluation. 0100: Placed on EFM. Reassuring FHR noted. 0120: SVE 2/90/-2 with some bloody show noted. 0145: Spoke with JAKY Mckinney. Report given on pt status, hx, assessment, SVE. ORders received for admission. 0230: Pt transferred to  8 for admission. 0340: Pt requesting an epidural for pain mangement. 0405 CRNA at bedside for epidural placement. Procedure explained to include risks and benefits. Verbalizes understanding. All questions answered. 0410: Sitting up on side of bed. Position reviewed. 0417:Time out completed    0420: Epidural started    0425: Test dose    0430: Assisted back to bed after epidural placement. Tolerated procedure well. Vital signs stable. Monitors Adjusted. 5309: SVE 4/90/-2.   0715: Verbal SBAR report given to YESSICA Farias RN. Relinquished care of pt at this time.

## 2022-01-24 NOTE — PROGRESS NOTES
Labor Progress Note  Patient seen, fetal heart rate and contraction pattern evaluated, patient examined. Reports strong urge to push with uc's. Attempting to push with uc's with minimal effort  No data found. Physical Exam:  Cervical Exam:  10 cm dilated    100% effaced    -1 station    Presenting Part: cephalic  Cervical Position: mid position  Membranes:  leaking clear fluid  Uterine Activity: Frequency: Every 2 minutes, Duration: 70 seconds and Intensity: see nurses notes for IUPC  Fetal Heart Rate: Baseline: 165 per minute  Variability: moderate  Accelerations: no  Decelerations: variable and late    Assessment/Plan:  Cat 2 tracing, maternal temp now 101.2. Start triple antibiotics for chorio. Tylenol 1000mg po given. Anesthesia called for epidural bolus.  DR. Mitch Ha notified and in agreement with POC

## 2022-01-25 LAB
HCT VFR BLD AUTO: 30.2 % (ref 35–45)
HGB BLD-MCNC: 9.2 G/DL (ref 12–16)

## 2022-01-25 PROCEDURE — 74011250636 HC RX REV CODE- 250/636: Performed by: MIDWIFE

## 2022-01-25 PROCEDURE — 85018 HEMOGLOBIN: CPT

## 2022-01-25 PROCEDURE — 85014 HEMATOCRIT: CPT

## 2022-01-25 PROCEDURE — 88307 TISSUE EXAM BY PATHOLOGIST: CPT

## 2022-01-25 PROCEDURE — 36415 COLL VENOUS BLD VENIPUNCTURE: CPT

## 2022-01-25 PROCEDURE — 74011250636 HC RX REV CODE- 250/636: Performed by: ADVANCED PRACTICE MIDWIFE

## 2022-01-25 PROCEDURE — 65270000029 HC RM PRIVATE

## 2022-01-25 PROCEDURE — 74011250637 HC RX REV CODE- 250/637: Performed by: MIDWIFE

## 2022-01-25 PROCEDURE — 74011000258 HC RX REV CODE- 258: Performed by: ADVANCED PRACTICE MIDWIFE

## 2022-01-25 RX ADMIN — CLINDAMYCIN PHOSPHATE 900 MG: 900 INJECTION, SOLUTION INTRAVENOUS at 18:43

## 2022-01-25 RX ADMIN — AMPICILLIN SODIUM 2 G: 2 INJECTION, POWDER, FOR SOLUTION INTRAMUSCULAR; INTRAVENOUS at 12:57

## 2022-01-25 RX ADMIN — CLINDAMYCIN PHOSPHATE 900 MG: 900 INJECTION, SOLUTION INTRAVENOUS at 02:43

## 2022-01-25 RX ADMIN — SODIUM CHLORIDE, POTASSIUM CHLORIDE, SODIUM LACTATE AND CALCIUM CHLORIDE 125 ML/HR: 600; 310; 30; 20 INJECTION, SOLUTION INTRAVENOUS at 11:16

## 2022-01-25 RX ADMIN — OXYCODONE AND ACETAMINOPHEN 1 TABLET: 5; 325 TABLET ORAL at 13:32

## 2022-01-25 RX ADMIN — LEVOTHYROXINE SODIUM 125 MCG: 0.1 TABLET ORAL at 06:53

## 2022-01-25 RX ADMIN — OXYCODONE AND ACETAMINOPHEN 1 TABLET: 5; 325 TABLET ORAL at 23:34

## 2022-01-25 RX ADMIN — SODIUM CHLORIDE, POTASSIUM CHLORIDE, SODIUM LACTATE AND CALCIUM CHLORIDE 125 ML/HR: 600; 310; 30; 20 INJECTION, SOLUTION INTRAVENOUS at 01:00

## 2022-01-25 RX ADMIN — AMPICILLIN SODIUM 2 G: 2 INJECTION, POWDER, FOR SOLUTION INTRAMUSCULAR; INTRAVENOUS at 06:53

## 2022-01-25 RX ADMIN — CLINDAMYCIN PHOSPHATE 900 MG: 900 INJECTION, SOLUTION INTRAVENOUS at 11:14

## 2022-01-25 RX ADMIN — AMPICILLIN SODIUM 2 G: 2 INJECTION, POWDER, FOR SOLUTION INTRAMUSCULAR; INTRAVENOUS at 00:54

## 2022-01-25 RX ADMIN — AMPICILLIN SODIUM 2 G: 2 INJECTION, POWDER, FOR SOLUTION INTRAMUSCULAR; INTRAVENOUS at 19:47

## 2022-01-25 NOTE — PROGRESS NOTES
2022  12:52 PM    Anesthesia Post-Op Rounding Note  Daily Management of  Epidural Opioid    Referring physician: Bell Ralph MD   Patient status post Procedure(s):   SECTION on 2022    POST OP Day #1    Visit Vitals  BP (!) 103/58 (BP 1 Location: Right upper arm)   Pulse (!) 114   Temp 37.7 °C (99.8 °F)   Resp 18   Ht 5' 1\" (1.549 m)   Wt 91.6 kg (202 lb)   SpO2 96%   Breastfeeding Yes   BMI 38.17 kg/m²             Patient rates pain 2 out of 10. Pain is subjectively rated by patient as mild . No sedation, pruritis, or nausea noted. No complications, adequate analgesia. Continue current postop pain regimen.       Navneet Tapia MD

## 2022-01-25 NOTE — PROGRESS NOTES
TRANSFER - IN REPORT:    Verbal report received from CRNA (name) on Sherri Garvin  being received from LD OR(unit) for routine post - op      Report consisted of patients Situation, Background, Assessment and   Recommendations(SBAR). Information from the following report(s) SBAR, Kardex, ED Summary, OR Summary, Procedure Summary, Intake/Output, MAR and Accordion was reviewed with the receiving nurse. Opportunity for questions and clarification was provided. Assessment completed upon patients arrival to unit and care assumed.

## 2022-01-25 NOTE — PROGRESS NOTES
1950 JAKY Solano at bedside assessing Pt.   1953  JAKY Field CNM  Initiate trial push with 1 contractions deceleration of fetal heart rate noted. Pt advised by JAKY Solano. to stop pushing. 2000 C- Section called by MD. Marta Frausto. 2025 Doctor Renee Davidson at bedside to discuss anesthesia. 2055 Pt transported to OR via Bed.

## 2022-01-25 NOTE — PROGRESS NOTES
C/Section called. Pt reports after last bolus through epidural catheter only felt pressure from contractions. Discussed use of epidural for surgery.

## 2022-01-25 NOTE — PROGRESS NOTES
Bedside and Verbal shift change report received from [address] [place] [address] YESSICARN (off going nurse)  by Nicolas Buenrostro RN (oncoming nurse)   (Report included the following information SBAR, Kardex, Procedure Summary, Intake/Output, MAR, Recent Results, Med Rec Status and Alarm Parameters .

## 2022-01-25 NOTE — PROGRESS NOTES
0130 TRANSFER - IN REPORT:     Verbal report received from Brenda Gamble RN (name) on Sulaiman Gonsalez  being received from L&D (unit) for routine progression of care       Report consisted of patients Situation, Background, Assessment and   Recommendations(SBAR).    Information from the following report(s) SBAR, Kardex, Procedure Summary, Intake/Output, MAR and Recent Results was reviewed with the receiving nurse.     Opportunity for questions and clarification was provided.       Assessment completed upon patients arrival to unit and care assumed. 0300 Assisted patient with breastfeeding. Nipple shield, sugar water, reawakening attempted. 0425 Patient breastfeeding infant.    0408 Verbal shift change report given to JANAE Fu LPN (oncoming nurse) by Raf Barry. Reddy Dash RN (offgoing nurse). Report included the following information SBAR, Kardex, Procedure Summary, Intake/Output, MAR and Recent Results.

## 2022-01-25 NOTE — PROGRESS NOTES
OB Labor Note    Assessment:   G1 at 41+0, IUP in second stage of spont. Labor - s/p AROM, pitocin augmentation (now off for CAT II fetal tracing)   Cat II fetal tracing - overall reassuring for moderate variability   Moderate risk PPH   Comfortable with epidural   Chorioamnionitis - ampicillin done/ clindamycin now infusing/ gentamycin ordered, tylenol given and currently afebrile    Plan: D/W Dr. Jelani Caputo need for PCD - high station after laboring down over 2 hrs., fetal late/variable decelerations with active pushing - agrees and d/w patient and spouse - discussed with patient risks of : bleeding, infection, damage to other organs    Subjective:   Pt. does have complaints of rectal pressure. Pt. is feeling contractions with rectal pressure increasing. Pt. is feeling fetal movement. Objective: v/e c/c/-2    Cat II fetal tracing - baseline rate 170, accelerations, occ. Late deceleration without active pushing, with pushing variable/late recurrent deceleration, moderate variability    Ctx: every 2-3 min. Visit Vitals  /76   Pulse (!) 116   Temp 99.5 °F (37.5 °C)   Resp 20   Ht 5' 1\" (1.549 m)   Wt 91.6 kg (202 lb)   SpO2 94%   Breastfeeding Yes   BMI 38.17 kg/m²      Cervical Exam  Dilation (cm): 8  Eff: 100 %  Station: -1  Position: Mid  Cervical Consistency: Moderate  Vaginal exam done by? : NEHAL Helms CNM   Baby Position: Vertex  Membrane Status: AROM     Patient Vitals for the past 4 hrs: Mode Fetal Heart Rate Variability Decelerations Accelerations RN Reviewed Strip?   22 1900 US Readjusted; External 175 6-25 BPM (!) Variable;Late No Yes   22 1845 External 165 6-25 BPM (!) Variable;Late Yes Yes   22 1830 US Readjusted; External 180 6-25 BPM None No    22 1815 US Readjusted; External 175 6-25 BPM (!) Early;Variable;Late  Yes   22 1800 US Readjusted; External 175 6-25 BPM (!) Early;Variable;Late Yes Yes   22 1745  Milo Son  Yes   22 1730 US Readjusted; External 165 6-25 BPM Early;Variable No Yes   01/24/22 1715      Yes   01/24/22 1700 External 160 6-25 BPM (!) Early; Late Yes Yes   01/24/22 1645      Yes   01/24/22 1630 External 150 6-25 BPM (!) Late Yes Yes   01/24/22 1615      Yes          1/24/2022 8:13 PM     Pt seen and agree with above. Surgical risks reviewed with patient and her partner.      Nito Martini M.D.

## 2022-01-25 NOTE — PROGRESS NOTES
Problem: Vaginal Delivery: Discharge Outcomes  Goal: *Vital signs within defined limits  Outcome: Progressing Towards Goal     Problem:  Delivery: Postpartum Day 1  Goal: Activity/Safety  Outcome: Progressing Towards Goal  Goal: Diagnostic Test/Procedures  Outcome: Progressing Towards Goal  Goal: Nutrition/Diet  Outcome: Progressing Towards Goal  Goal: Discharge Planning  Outcome: Progressing Towards Goal  Goal: Medications  Outcome: Progressing Towards Goal  Goal: Respiratory  Outcome: Progressing Towards Goal  Goal: Treatments/Interventions/Procedures  Outcome: Progressing Towards Goal  Goal: Psychosocial  Outcome: Progressing Towards Goal  Goal: *Vital signs within defined limits  Outcome: Progressing Towards Goal  Goal: *Hemodynamically stable  Outcome: Progressing Towards Goal  Goal: *Optimal pain control at patient's stated goal  Outcome: Progressing Towards Goal  Goal: *Participates in infant care  Outcome: Progressing Towards Goal  Goal: *Tolerating diet  Outcome: Progressing Towards Goal     Problem: Pain  Goal: *Control of Pain  Outcome: Progressing Towards Goal     Problem: Patient Education: Go to Patient Education Activity  Goal: Patient/Family Education  Outcome: Progressing Towards Goal

## 2022-01-25 NOTE — OP NOTES
Section Operative Note     Surgeon(s): Dre Turner M.D. Surgical Assistant: * No surgical staff found *  Pre-operative Diagnosis: Intrauterine pregnancy at 39 0/7 weeks; Arrest of Descent; Chorioamnionitis; Fetal Intolerance to Labor   Post-operative Diagnosis: same as preop diagnosis. Procedure(s) Performed: Procedure(s):   SECTION                                             Anesthesia: Epidural  Findings: viable female infant, Apgars 8,9; meconium, normal uterus, ovaries, tubes  Complications: none  Estimated Blood Loss: 500 mL  Specimens: placenta, cord blood for gases  Implants: none  Procedure:   Patient was taken to the OR after informed consent had been obtained. Epidural was then bolused. She was then placed in a dorsal supine position with a left lateral tilt. She was then prepped and draped in a sterile fashion. Time out was completed. Attention was turned to the abdomen and an Allis test confirmed adequate anesthesia. A Pfannenstiel skin incision was made 3 cm above the symphysis pubis. The incision was carried down to the fascia. The fascia was opened in the midline with sharp dissection. The rectus muscle was divided bluntly. The peritoneum was entered with good visualization of underlying structures. The bladder blade was inserted. The vesicouterine peritoneum was incised in a semi lunar fashion and the bladder flap was created using blunt and sharp dissection. The uterus was scored in the lower uterine segment and then entered in the midline. The uterine incision was extended bilaterally, transversly. The fetal head was flexed, pressure was applied to the abdomen and the fetus was delivered through the uterine incision. Naso and oropharynx were bulb suctioned. The cord was clamped and cut. The infant was handed to the waiting pediatric nurse. The placenta was manually extracted. The uterus was cleared of all clot and debris.  The incision was closed with 0 Vicryl in a running fashion. A second layer of 0 vicryl was placed in an imbricating fashion. Excellent hemostasis was noted. The uterus was returned to the abdomen. The rectus muscles were reapproximated using two interrupted sutures of 0 vicryl in a figure of eight fashion. The fascia was closed in a running fashion with 0 vicryl. The skin was closed with Insorb staples and covered with a sterile dressing. The counts were correct. The mother and child tolerated the procedure well.      Ludwin Ornelas M.D.

## 2022-01-25 NOTE — PROGRESS NOTES
Problem: Pressure Injury - Risk of  Goal: *Prevention of pressure injury  Description: Document Ismael Scale and appropriate interventions in the flowsheet. Outcome: Progressing Towards Goal  Note: Pressure Injury Interventions:       Moisture Interventions: Absorbent underpads    Activity Interventions: Pressure redistribution bed/mattress(bed type)    Mobility Interventions: Pressure redistribution bed/mattress (bed type)    Nutrition Interventions: Offer support with meals,snacks and hydration                     Problem: Patient Education: Go to Patient Education Activity  Goal: Patient/Family Education  Outcome: Progressing Towards Goal     Problem: Falls - Risk of  Goal: *Absence of Falls  Description: Document Soha Fall Risk and appropriate interventions in the flowsheet.   Outcome: Progressing Towards Goal  Note: Fall Risk Interventions:            Medication Interventions: Teach patient to arise slowly    Elimination Interventions: Call light in reach              Problem: Patient Education: Go to Patient Education Activity  Goal: Patient/Family Education  Outcome: Progressing Towards Goal     Problem: Patient Education: Go to Patient Education Activity  Goal: Patient/Family Education  Outcome: Progressing Towards Goal     Problem:  Delivery: Postpartum Day 1  Goal: Activity/Safety  Outcome: Progressing Towards Goal  Goal: Consults, if ordered  Outcome: Progressing Towards Goal  Goal: Diagnostic Test/Procedures  Outcome: Progressing Towards Goal  Goal: Nutrition/Diet  Outcome: Progressing Towards Goal  Goal: Discharge Planning  Outcome: Progressing Towards Goal  Goal: Medications  Outcome: Progressing Towards Goal  Goal: Respiratory  Outcome: Progressing Towards Goal  Goal: Treatments/Interventions/Procedures  Outcome: Progressing Towards Goal  Goal: Psychosocial  Outcome: Progressing Towards Goal  Goal: *Vital signs within defined limits  Outcome: Progressing Towards Goal  Goal: *Labs within defined limits  Outcome: Progressing Towards Goal  Goal: *Hemodynamically stable  Outcome: Progressing Towards Goal  Goal: *Optimal pain control at patient's stated goal  Outcome: Progressing Towards Goal  Goal: *Participates in infant care  Outcome: Progressing Towards Goal  Goal: *Demonstrates progressive activity  Outcome: Progressing Towards Goal  Goal: *Tolerating diet  Outcome: Progressing Towards Goal  Goal: *Performs self perineal care  Outcome: Progressing Towards Goal

## 2022-01-25 NOTE — ANESTHESIA POSTPROCEDURE EVALUATION
Post-Anesthesia Evaluation and Assessment    Cardiovascular Function/Vital Signs  Visit Vitals  /72   Pulse 98   Temp 36.9 °C (98.4 °F)   Resp 16   Ht 5' 1\" (1.549 m)   Wt 91.6 kg (202 lb)   SpO2 98%   Breastfeeding Yes   BMI 38.17 kg/m²       Patient is status post Procedure(s):   SECTION. Nausea/Vomiting: Controlled. Postoperative hydration reviewed and adequate. Pain:  Pain Scale 1: Numeric (0 - 10) (22)  Pain Intensity 1: 0 (22)   Managed. Neurological Status:   Neuro (WDL): Within Defined Limits (22)   At baseline. Mental Status and Level of Consciousness: Arousable. Pulmonary Status:   O2 Device: None (22)   Adequate oxygenation and airway patent. Complications related to anesthesia: None    Post-anesthesia assessment completed. No concerns. Patient has met all discharge requirements.     Signed By: Ti Jung CRNA    2022

## 2022-01-25 NOTE — PROGRESS NOTES
Assumed care of pt.  0745-breast feeding. 0830-VSS. Assessment completed. Pad changed. 1015-assisted with breast feeding. 1115-martínez d/c'd. Ambulated to bathroom. Radha-care completed. Abd binder placed. Ambulated back to bed. Sitting on edge of bed.  1200-resting in bed. Denies needs. 1300-awake in bed. Denies needs. 1335-assisted pt to bathroom. Voided 700 ml without diff. Radha-care completed. 1550-reassessment completed. Denies needs. 1700-resting in bed. Denies needs. 1845-ambulated to [facility] for infant bath. 1920-Bedside and Verbal shift change report given to T. Charmayne Coral RN (oncoming nurse) by JANAE Fu LPN (offgoing nurse). Report given with SBAR, Kardex, Intake/Output, MAR and Recent Results.

## 2022-01-25 NOTE — LACTATION NOTE
This note was copied from a baby's chart. 0 Mom educated on breastfeeding basics--hunger cues, feeding on demand, waking baby if baby sleeps too long between feeds, importance of skin to skin, positioning and latching, risk of pacifier use and supplemental feedings, and importance of rooming in--and use of log sheet. Mom also educated on benefits of breastfeeding for herself and baby. Mom verbalized understanding. No questions at this time. Mom needing to get up to the bathroom. Will return. 1 mom in the recliner. Attempted to get  latched on both sides in the football and cross cradled position. Bowie very fussy, no latch achieved. Hand expression education completed with mom and handout with spoon given for reinforcement. Showed how to feed infant expressed colostrum with spoon. Mom verbalized understanding and no questions at this time. Spoon fed 1/2 spoon. Encouraged frequent attempt at burping to help  with expelling gas. Parents verbalized understanding. 1809 infant latched and nursed well for about 3 minutes, then fell asleep. Attempted to stimulate to wake , unsuccessful. Mom was able to hand express 1/4 of spoon and it was fed to . 2255 using sweeties, was able to get  latched on/off for several minutes.  not getting a deep latch. Provided a 20 mm NS, infant latched and nursing well. Discussed attempting to get  latched without NS, but to use if  is unable to get deep latch.

## 2022-01-25 NOTE — PROGRESS NOTES
TRANSFER - OUT REPORT:    Verbal report given to CAMERON Joseph RN(name) on Neli Lombardo  being transferred to [facility](unit) for routine progression of care       Report consisted of patients Situation, Background, Assessment and   Recommendations(SBAR). Information from the following report(s) SBAR, Kardex, OR Summary, Procedure Summary, Intake/Output, MAR, Accordion and Recent Results was reviewed with the receiving nurse. Lines:   Peripheral IV 01/24/22 Left;Posterior Hand (Active)   Site Assessment Clean, dry, & intact 01/24/22 0713   Phlebitis Assessment 0 01/24/22 0713   Infiltration Assessment 0 01/24/22 0713   Dressing Status Clean, dry, & intact 01/24/22 0713   Dressing Type 4 X 4;Tape 01/24/22 0713   Hub Color/Line Status Infusing 01/24/22 0713   Alcohol Cap Used Yes 01/24/22 9668        Opportunity for questions and clarification was provided. Patient transported with: Bed, Infant on basinet. All questions answered.  Symone Amin Registered Nurse

## 2022-01-26 PROCEDURE — 65270000029 HC RM PRIVATE

## 2022-01-26 PROCEDURE — 74011250637 HC RX REV CODE- 250/637: Performed by: MIDWIFE

## 2022-01-26 RX ORDER — OXYCODONE AND ACETAMINOPHEN 5; 325 MG/1; MG/1
1-2 TABLET ORAL
Qty: 12 TABLET | Refills: 0 | Status: SHIPPED | OUTPATIENT
Start: 2022-01-26 | End: 2022-02-02

## 2022-01-26 RX ADMIN — OXYCODONE AND ACETAMINOPHEN 1 TABLET: 5; 325 TABLET ORAL at 23:21

## 2022-01-26 RX ADMIN — OXYCODONE AND ACETAMINOPHEN 1 TABLET: 5; 325 TABLET ORAL at 13:07

## 2022-01-26 RX ADMIN — OXYCODONE AND ACETAMINOPHEN 1 TABLET: 5; 325 TABLET ORAL at 18:32

## 2022-01-26 RX ADMIN — LEVOTHYROXINE SODIUM 125 MCG: 0.1 TABLET ORAL at 05:28

## 2022-01-26 RX ADMIN — OXYCODONE AND ACETAMINOPHEN 1 TABLET: 5; 325 TABLET ORAL at 05:28

## 2022-01-26 NOTE — PROGRESS NOTES
Problem:  Delivery: Postpartum Day 1  Goal: Activity/Safety  Outcome: Progressing Towards Goal  Goal: Consults, if ordered  Outcome: Progressing Towards Goal  Goal: Diagnostic Test/Procedures  Outcome: Progressing Towards Goal  Goal: Nutrition/Diet  Outcome: Progressing Towards Goal  Goal: Discharge Planning  Outcome: Progressing Towards Goal  Goal: Medications  Outcome: Progressing Towards Goal  Goal: Respiratory  Outcome: Progressing Towards Goal  Goal: Treatments/Interventions/Procedures  Outcome: Progressing Towards Goal  Goal: Psychosocial  Outcome: Progressing Towards Goal  Goal: *Vital signs within defined limits  Outcome: Progressing Towards Goal  Goal: *Labs within defined limits  Outcome: Progressing Towards Goal  Goal: *Hemodynamically stable  Outcome: Progressing Towards Goal  Goal: *Optimal pain control at patient's stated goal  Outcome: Progressing Towards Goal  Goal: *Participates in infant care  Outcome: Progressing Towards Goal  Goal: *Demonstrates progressive activity  Outcome: Progressing Towards Goal  Goal: *Tolerating diet  Outcome: Progressing Towards Goal  Goal: *Performs self perineal care  Outcome: Progressing Towards Goal     Problem: Pain  Goal: *Control of Pain  Outcome: Progressing Towards Goal

## 2022-01-26 NOTE — PROGRESS NOTES
3565- Bedside and Verbal shift change report given to Jed (oncoming nurse) by Maine Johnson (offgoing nurse). Report included the following information SBAR, Intake/Output, MAR and Recent Results. 7325- shift assessment complete, see flowsheets    1125- pt resting in bed. No needs at this time    1307- pt medicated per [facility]    1505- Pt breastfeeding infant- called about yellow discharge on infant eye- eye care given and educated to parents    0- pt breastfeeding    26- pt breastfeeding    [phone]- reassessment complete, see flowsheets    786 2304- pt getting up to get in shower    1832- pt medicated per [facility]    1910- Bedside and Verbal shift change report given to Lynne (oncoming nurse) by Delmar Santiago (offgoing nurse). Report included the following information SBAR, Intake/Output, MAR and Recent Results.

## 2022-01-26 NOTE — DISCHARGE SUMMARY
Obstetrical Discharge Summary     Name: Magnus Person MRN: [id number]  SSN: [id number]    YOB: 1985  Age: 43 y.o. Sex: female      Admit Date: 2022    Discharge Date: 2022     Admitting Physician: Desiree Johnson MD     Attending Physician:  Tree Cano MD     Admission Diagnoses: Term pregnancy [Z34.90]    Discharge Diagnoses:   Information for the patient's :  Efraín Manuel [[id number]]   Delivery of a 3.385 kg female infant via , Low Transverse on 2022 at 9:07 PM  by Edgar Gutiérrez. Apgars were 8  and 9 . Additional Diagnoses:   Hospital Problems  Date Reviewed: 2022          Codes Class Noted POA     delivery delivered ICD-10-CM: O82  ICD-9-CM: 669.71  2022 Unknown        Term pregnancy ICD-10-CM: Z34.90  ICD-9-CM: V22.1  2022 Unknown           No results found for: RUBELLAEXT, GRBSEXT    Immunization(s):   There is no immunization history on file for this patient. Hospital Course: Normal hospital course following the delivery. Patient Instructions:   Current Discharge Medication List      START taking these medications    Details   oxyCODONE-acetaminophen (PERCOCET) 5-325 mg per tablet Take 1-2 Tablets by mouth every six (6) hours as needed for Pain for up to 7 days. Max Daily Amount: 8 Tablets. Qty: 12 Tablet, Refills: 0    Associated Diagnoses:  delivery delivered         CONTINUE these medications which have NOT CHANGED    Details   levothyroxine (Synthroid) 112 mcg tablet Take  by mouth Daily (before breakfast). PNV Comb #2-Iron-Omega 3-FA [id number] mg cmpk Take  by mouth. Reference my discharge instructions.     Follow-up Appointments   Procedures    FOLLOW UP VISIT Appointment in: 6 Weeks Please call OB office to schedule 6 week postpartum visit     Please call OB office to schedule 6 week postpartum visit     Standing Status:   Standing     Number of Occurrences:   1     Order Specific Question:   Appointment in     Answer:   6 Weeks        Signed By:  Yaya Gallardo CNM     January 26, 2022

## 2022-01-26 NOTE — PROGRESS NOTES
2320- Bedside and Verbal shift change report given to Jenaro Stephenson RN (oncoming nurse) by Breanna Sheridan RN (offgoing nurse). Report included the following information SBAR, Intake/Output, MAR and Recent Results.

## 2022-01-26 NOTE — PROGRESS NOTES
Post-Operative  Day 1    Kt Getting  [phone]      Information for the patient's :  Emiliana Culp [[id number]]   , Low Transverse    Patient doing well without significant complaint. Tolerating diet, yes flatus, martínez removed. Patient is breastfeeding. Lochia reportedly normal.    Vitals:  Visit Vitals  /71 (BP 1 Location: Right upper arm, BP Patient Position: At rest)   Pulse (!) 113   Temp 99.2 °F (37.3 °C)   Resp 18   Ht 5' 1\" (1.549 m)   Wt 91.6 kg (202 lb)   SpO2 98%   Breastfeeding Yes   BMI 38.17 kg/m²     Temp (24hrs), Av.8 °F (37.1 °C), Min:97.7 °F (36.5 °C), Max:99.8 °F (37.7 °C)      Last 24hr Input/Output:    Intake/Output Summary (Last 24 hours) at 2022 0101  Last data filed at 2022 1750  Gross per 24 hour   Intake    Output 5675 ml   Net -5675 ml        Exam:    Patient without distress. Lungs clear. Abdomen, bowel sounds present, soft, expected tenderness, fundus firm -1   Wound dressing intact. Labs:   Recent Results (from the past 24 hour(s))   HEMATOCRIT    Collection Time: 22  4:47 AM   Result Value Ref Range    HCT 30.2 (L) 35.0 - 45.0 %   HEMOGLOBIN    Collection Time: 22  4:47 AM   Result Value Ref Range    HGB 9.2 (L) 12.0 - 16.0 g/dL     Assessment: Post-Op day 1, stable. Plan:   1. Routine post-operative care as per  day one orders. 2.  Unsure if wants to go home tomorrow . Will put in DC order and can change if wants to stay another day.        [address] , [facility]  2022  1:01 AM

## 2022-01-26 NOTE — PROGRESS NOTES
Problem: Patient Education: Go to Patient Education Activity  Goal: Patient/Family Education  Outcome: Progressing Towards Goal     Problem: Pressure Injury - Risk of  Goal: *Prevention of pressure injury  Description: Document Ismael Scale and appropriate interventions in the flowsheet. Outcome: Progressing Towards Goal  Note: Pressure Injury Interventions:       Moisture Interventions: Absorbent underpads    Activity Interventions: Pressure redistribution bed/mattress(bed type)    Mobility Interventions: Pressure redistribution bed/mattress (bed type)    Nutrition Interventions: Offer support with meals,snacks and hydration                     Problem: Patient Education: Go to Patient Education Activity  Goal: Patient/Family Education  Outcome: Progressing Towards Goal     Problem: Falls - Risk of  Goal: *Absence of Falls  Description: Document Soha Fall Risk and appropriate interventions in the flowsheet.   Outcome: Progressing Towards Goal  Note: Fall Risk Interventions:            Medication Interventions: Teach patient to arise slowly    Elimination Interventions: Call light in reach              Problem: Patient Education: Go to Patient Education Activity  Goal: Patient/Family Education  Outcome: Progressing Towards Goal     Problem: Patient Education: Go to Patient Education Activity  Goal: Patient/Family Education  Outcome: Progressing Towards Goal     Problem:  Delivery: Postpartum Day 1  Goal: Off Pathway (Use only if patient is Off Pathway)  Outcome: Progressing Towards Goal  Goal: Activity/Safety  Outcome: Progressing Towards Goal  Goal: Consults, if ordered  Outcome: Progressing Towards Goal  Goal: Diagnostic Test/Procedures  Outcome: Progressing Towards Goal  Goal: Nutrition/Diet  Outcome: Progressing Towards Goal  Goal: Discharge Planning  Outcome: Progressing Towards Goal  Goal: Medications  Outcome: Progressing Towards Goal  Goal: Respiratory  Outcome: Progressing Towards Goal  Goal: Treatments/Interventions/Procedures  Outcome: Progressing Towards Goal  Goal: Psychosocial  Outcome: Progressing Towards Goal  Goal: *Vital signs within defined limits  Outcome: Progressing Towards Goal  Goal: *Labs within defined limits  Outcome: Progressing Towards Goal  Goal: *Hemodynamically stable  Outcome: Progressing Towards Goal  Goal: *Optimal pain control at patient's stated goal  Outcome: Progressing Towards Goal  Goal: *Participates in infant care  Outcome: Progressing Towards Goal  Goal: *Demonstrates progressive activity  Outcome: Progressing Towards Goal  Goal: *Tolerating diet  Outcome: Progressing Towards Goal  Goal: *Performs self perineal care  Outcome: Progressing Towards Goal     Problem:  Delivery: Postpartum Day 2  Goal: Off Pathway (Use only if patient is Off Pathway)  Outcome: Progressing Towards Goal  Goal: Activity/Safety  Outcome: Progressing Towards Goal  Goal: Consults, if ordered  Outcome: Progressing Towards Goal  Goal: Nutrition/Diet  Outcome: Progressing Towards Goal  Goal: Discharge Planning  Outcome: Progressing Towards Goal  Goal: Medications  Outcome: Progressing Towards Goal  Goal: Treatments/Interventions/Procedures  Outcome: Progressing Towards Goal  Goal: Psychosocial  Outcome: Progressing Towards Goal  Goal: *Vital signs within defined limits  Outcome: Progressing Towards Goal  Goal: *Labs within defined limits  Outcome: Progressing Towards Goal  Goal: *Hemodynamically stable  Outcome: Progressing Towards Goal  Goal: *Optimal pain control at patient's stated goal  Outcome: Progressing Towards Goal  Goal: *Participates in infant care  Outcome: Progressing Towards Goal  Goal: *Demonstrates progressive activity  Outcome: Progressing Towards Goal  Goal: *Appropriate parent-infant bonding  Outcome: Progressing Towards Goal  Goal: *Tolerating diet  Outcome: Progressing Towards Goal     Problem:  Delivery: Postpartum Day 3  Goal: Off Pathway (Use only if patient is Off Pathway)  Outcome: Progressing Towards Goal  Goal: Activity/Safety  Outcome: Progressing Towards Goal  Goal: Consults, if ordered  Outcome: Progressing Towards Goal  Goal: Nutrition/Diet  Outcome: Progressing Towards Goal  Goal: Discharge Planning  Outcome: Progressing Towards Goal  Goal: Medications  Outcome: Progressing Towards Goal  Goal: Treatments/Interventions/Procedures  Outcome: Progressing Towards Goal  Goal: Psychosocial  Outcome: Progressing Towards Goal     Problem:  Delivery: Discharge Outcomes  Goal: *Follow-up appointments as indicated  Outcome: Progressing Towards Goal  Goal: *Describes available resources and support systems  Outcome: Progressing Towards Goal  Goal: *No signs and symptoms of infection  Outcome: Progressing Towards Goal  Goal: *Birth certificate information completed  Outcome: Progressing Towards Goal  Goal: *Received and verbalizes understanding of discharge plan and instructions  Outcome: Progressing Towards Goal  Goal: *Vital signs within defined limits  Outcome: Progressing Towards Goal  Goal: *Labs within defined limits  Outcome: Progressing Towards Goal  Goal: *Hemodynamically stable  Outcome: Progressing Towards Goal  Goal: *Optimal pain control at patient's stated goal  Outcome: Progressing Towards Goal  Goal: *Participates in infant care  Outcome: Progressing Towards Goal  Goal: *Demonstrates progressive activity  Outcome: Progressing Towards Goal  Goal: *Appropriate parent-infant bonding  Outcome: Progressing Towards Goal  Goal: *Tolerating diet  Outcome: Progressing Towards Goal  Goal: *Verbalizes name, dosage, time, side effects, and number of days to continue medications  Outcome: Progressing Towards Goal  Goal: *Influenza vaccine administered (October-March)  Outcome: Progressing Towards Goal     Problem: Pain  Goal: *Control of Pain  Outcome: Progressing Towards Goal     Problem: Patient Education: Go to Patient Education Activity  Goal: Patient/Family Education  Outcome: Progressing Towards Goal

## 2022-01-26 NOTE — LACTATION NOTE
This note was copied from a baby's chart. Infant latched and nursing well with nipple shield. DOL expectations discussed. Mom verbalized understanding and no questions at this time. Will page as needed.

## 2022-01-26 NOTE — ROUTINE PROCESS
Bedside and Verbal shift change report given to CAMERON Adamson RN (oncoming nurse) by REJI Mckinnon RN (offgoing nurse). Report included the following information SBAR, Kardex, Intake/Output, MAR and Recent Results.

## 2022-01-26 NOTE — PROGRESS NOTES
Problem: Pressure Injury - Risk of  Goal: *Prevention of pressure injury  Description: Document Ismael Scale and appropriate interventions in the flowsheet. Outcome: Progressing Towards Goal  Note: Pressure Injury Interventions:       Moisture Interventions: Absorbent underpads    Activity Interventions: Pressure redistribution bed/mattress(bed type)    Mobility Interventions: Pressure redistribution bed/mattress (bed type)    Nutrition Interventions: Offer support with meals,snacks and hydration                     Problem: Falls - Risk of  Goal: *Absence of Falls  Description: Document Soha Fall Risk and appropriate interventions in the flowsheet.   Outcome: Progressing Towards Goal  Note: Fall Risk Interventions:            Medication Interventions: Teach patient to arise slowly    Elimination Interventions: Call light in reach              Problem:  Delivery: Postpartum Day 2  Goal: Activity/Safety  Outcome: Progressing Towards Goal  Goal: Consults, if ordered  Outcome: Progressing Towards Goal  Goal: Nutrition/Diet  Outcome: Progressing Towards Goal  Goal: Discharge Planning  Outcome: Progressing Towards Goal  Goal: Medications  Outcome: Progressing Towards Goal  Goal: Treatments/Interventions/Procedures  Outcome: Progressing Towards Goal  Goal: Psychosocial  Outcome: Progressing Towards Goal  Goal: *Vital signs within defined limits  Outcome: Progressing Towards Goal  Goal: *Labs within defined limits  Outcome: Progressing Towards Goal  Goal: *Hemodynamically stable  Outcome: Progressing Towards Goal  Goal: *Optimal pain control at patient's stated goal  Outcome: Progressing Towards Goal  Goal: *Participates in infant care  Outcome: Progressing Towards Goal  Goal: *Demonstrates progressive activity  Outcome: Progressing Towards Goal  Goal: *Appropriate parent-infant bonding  Outcome: Progressing Towards Goal  Goal: *Tolerating diet  Outcome: Progressing Towards Goal

## 2022-01-27 VITALS
HEIGHT: 61 IN | BODY MASS INDEX: 38.14 KG/M2 | DIASTOLIC BLOOD PRESSURE: 68 MMHG | HEART RATE: 84 BPM | SYSTOLIC BLOOD PRESSURE: 120 MMHG | RESPIRATION RATE: 18 BRPM | TEMPERATURE: 97.8 F | WEIGHT: 202 LBS | OXYGEN SATURATION: 98 %

## 2022-01-27 PROCEDURE — 74011250637 HC RX REV CODE- 250/637: Performed by: MIDWIFE

## 2022-01-27 RX ADMIN — LEVOTHYROXINE SODIUM 125 MCG: 0.1 TABLET ORAL at 06:45

## 2022-01-27 RX ADMIN — OXYCODONE AND ACETAMINOPHEN 1 TABLET: 5; 325 TABLET ORAL at 06:45

## 2022-01-27 NOTE — PROGRESS NOTES
Problem: Pressure Injury - Risk of  Goal: *Prevention of pressure injury  Description: Document Ismael Scale and appropriate interventions in the flowsheet. Outcome: Progressing Towards Goal  Note: Pressure Injury Interventions:       Moisture Interventions: Absorbent underpads    Activity Interventions: Pressure redistribution bed/mattress(bed type)    Mobility Interventions: Pressure redistribution bed/mattress (bed type)    Nutrition Interventions: Offer support with meals,snacks and hydration                     Problem: Falls - Risk of  Goal: *Absence of Falls  Description: Document Soha Fall Risk and appropriate interventions in the flowsheet.   Outcome: Progressing Towards Goal  Note: Fall Risk Interventions:            Medication Interventions: Teach patient to arise slowly    Elimination Interventions: Call light in reach              Problem:  Delivery: Postpartum Day 3  Goal: Activity/Safety  Outcome: Progressing Towards Goal  Goal: Consults, if ordered  Outcome: Progressing Towards Goal  Goal: Nutrition/Diet  Outcome: Progressing Towards Goal  Goal: Discharge Planning  Outcome: Progressing Towards Goal  Goal: Medications  Outcome: Progressing Towards Goal  Goal: Treatments/Interventions/Procedures  Outcome: Progressing Towards Goal  Goal: Psychosocial  Outcome: Progressing Towards Goal     Problem:  Delivery: Discharge Outcomes  Goal: *Follow-up appointments as indicated  Outcome: Progressing Towards Goal  Goal: *Describes available resources and support systems  Outcome: Progressing Towards Goal  Goal: *No signs and symptoms of infection  Outcome: Progressing Towards Goal  Goal: *Birth certificate information completed  Outcome: Progressing Towards Goal  Goal: *Received and verbalizes understanding of discharge plan and instructions  Outcome: Progressing Towards Goal  Goal: *Vital signs within defined limits  Outcome: Progressing Towards Goal  Goal: *Labs within defined limits  Outcome: Progressing Towards Goal  Goal: *Hemodynamically stable  Outcome: Progressing Towards Goal  Goal: *Optimal pain control at patient's stated goal  Outcome: Progressing Towards Goal  Goal: *Participates in infant care  Outcome: Progressing Towards Goal  Goal: *Demonstrates progressive activity  Outcome: Progressing Towards Goal  Goal: *Appropriate parent-infant bonding  Outcome: Progressing Towards Goal  Goal: *Tolerating diet  Outcome: Progressing Towards Goal  Goal: *Verbalizes name, dosage, time, side effects, and number of days to continue medications  Outcome: Progressing Towards Goal  Goal: *Influenza vaccine administered (October-March)  Outcome: Progressing Towards Goal     Problem: Pain  Goal: *Control of Pain  Outcome: Progressing Towards Goal

## 2022-01-27 NOTE — PROGRESS NOTES
Problem: Patient Education: Go to Patient Education Activity  Goal: Patient/Family Education  Outcome: Progressing Towards Goal     Problem:  Delivery: Postpartum Day 2  Goal: Activity/Safety  Outcome: Progressing Towards Goal  Goal: Consults, if ordered  Outcome: Progressing Towards Goal  Goal: Discharge Planning  Outcome: Progressing Towards Goal  Goal: Medications  Outcome: Progressing Towards Goal  Goal: Treatments/Interventions/Procedures  Outcome: Progressing Towards Goal  Goal: Psychosocial  Outcome: Progressing Towards Goal  Goal: *Vital signs within defined limits  Outcome: Progressing Towards Goal  Goal: *Labs within defined limits  Outcome: Progressing Towards Goal  Goal: *Hemodynamically stable  Outcome: Progressing Towards Goal  Goal: *Optimal pain control at patient's stated goal  Outcome: Progressing Towards Goal  Goal: *Participates in infant care  Outcome: Progressing Towards Goal  Goal: *Demonstrates progressive activity  Outcome: Progressing Towards Goal  Goal: *Appropriate parent-infant bonding  Outcome: Progressing Towards Goal     Problem: Pain  Goal: *Control of Pain  Outcome: Progressing Towards Goal

## 2022-01-27 NOTE — PROGRESS NOTES
Progress Note                               Patient: Shelton Singh MRN: [id number]  SSN: [id number]    YOB: 1985  Age: 43 y.o. Sex: female      3 Days Post-Op     Subjective:     Patient doing well postpartum without significant complaints. Voiding without difficulty. Patient reports normal lochia. . Breastfeeding: Yes     Objective:     Patient Vitals for the past 18 hrs:   Temp Pulse Resp BP SpO2   22 0833 97.8 °F (36.6 °C) 84 18 120/68 98 %   22 2319 98.3 °F (36.8 °C) 86 18 123/79 100 %       Temp (24hrs), Av °F (36.7 °C), Min:97.8 °F (36.6 °C), Max:98.3 °F (36.8 °C)      Physical Exam:    General:   Patient without distress. Abdomen: Soft, fundus firm at level of umbilicus, nontender   Incision: Clean, dry, and intact without erythema   Lower Extremities: Negative for swelling, cords, or tenderness        Lab/Data Review: All lab results for the last 24 hours reviewed. Assessment and Plan: Will discharge home today.       Lu Alex CNM

## 2022-01-27 NOTE — PROGRESS NOTES
3161- Bedside and Verbal shift change report given to Jed (oncoming nurse) by [employer] (offgoing nurse). Report included the following information SBAR, Intake/Output, MAR and Recent Results. 8015- shift assessment complete, see flowsheets    9373- MLordCNM in room    1045- I have reviewed discharge instructions with the patient. The patient verbalized understanding. Infant AVS and H/P given and reviewed. All questions answered    1117- Patient armband removed and shredded. Infant hugs removed.  Bands verified    1200- pt discharged home with infant in stable condition

## 2022-01-27 NOTE — PROGRESS NOTES
1910 Bedside shift report given to JENNA Garnica, RN (Oncoming Nurse) from Michael Alex RN (outgoing nurse). Report consisted of patients Situation, Background, Assessment and Recommendations(SBAR). Information from the following report(s) SBAR, Kardex, Intake/Output, MAR and Recent Results. 2005 Pt rated pain at 0/10. Educated Pt on pain management, signs and symptoms to report. Discussed plan of care for the shift with Pt. Addressed needs at this time. 2319 Pt joined at bedside by baby and significant other. AAOx4. VSS. Pain 6/10. Educated Pt on pain management, signs and symptoms to report. Pain medication administered. Fundus Firm at  U and midline. Scant, rubra lochia. No clots noted. Bed in lowest position. Call bell within reach. 0037 Pt sleeping with respiratory rate greater than 10. Room light dimmed. 0149 Pt sleeping with respiratory rate greater than 10.     0410 Pt breastfeeding Infant. Pt rated pain at 0/10. Educated Pt on pain management. No needs at this time. 0552 Pt sleeping with respiratory rate greater than 10.     0705 Bedside shift report given to PAMELA Vaca, RN (Oncoming Nurse) from Anatoliy Escudero RN (outgoing nurse). Report consisted of patients Situation, Background, Assessment and Recommendations(SBAR). Information from the following report(s) SBAR, Kardex, Intake/Output, MAR and Recent Results.

## 2022-03-19 PROBLEM — Z34.90 PREGNANT: Status: ACTIVE | Noted: 2022-01-23

## 2022-03-19 PROBLEM — Z34.90 TERM PREGNANCY: Status: ACTIVE | Noted: 2022-01-24

## 2025-07-28 ENCOUNTER — HOSPITAL ENCOUNTER (OUTPATIENT)
Facility: HOSPITAL | Age: 40
Discharge: HOME | End: 2025-07-28
Attending: OBSTETRICS & GYNECOLOGY | Admitting: OBSTETRICS & GYNECOLOGY
Payer: COMMERCIAL

## 2025-07-28 VITALS
WEIGHT: 195 LBS | TEMPERATURE: 98.5 F | RESPIRATION RATE: 16 BRPM | DIASTOLIC BLOOD PRESSURE: 71 MMHG | SYSTOLIC BLOOD PRESSURE: 119 MMHG | HEIGHT: 61 IN | HEART RATE: 83 BPM | BODY MASS INDEX: 36.82 KG/M2

## 2025-07-28 PROBLEM — Z34.90 INTRAUTERINE PREGNANCY: Status: ACTIVE | Noted: 2025-07-28

## 2025-07-28 PROCEDURE — 59025 FETAL NON-STRESS TEST: CPT

## 2025-07-28 PROCEDURE — 99211 OFF/OP EST MAY X REQ PHY/QHP: CPT

## 2025-07-28 RX ORDER — MULTIVIT-MIN/IRON/FOLIC ACID/K 18-600-40
2000 CAPSULE ORAL DAILY
Status: ON HOLD | COMMUNITY
End: 2025-08-01

## 2025-07-28 RX ORDER — FERROUS SULFATE 325(65) MG
325 TABLET ORAL
COMMUNITY

## 2025-07-28 RX ORDER — FENUGREEK SEED/BL.THISTLE/ANIS 340 MG
CAPSULE ORAL
COMMUNITY

## 2025-07-28 NOTE — PROGRESS NOTES
1529 Pt is a 40 y.o.  at 40w6d, arrived to L&D triage with c/o of contractions sin last night.  EFM and TOCO applied, call bell within reach.     1542 H. SHADE Noyola at bedside. SVE: /-    1600 Discharge order received.     1620 Discharge instructions reviewed. Pt verbalizes understanding. Pt ambulates off of unit.

## 2025-07-28 NOTE — PROGRESS NOTES
Triage Progress Note        Clyde Rich is 40 y.o., No obstetric history on file., at Unknown presenting to L&D triage for concerns of labor. Pt denies VB, LOF and DFM at this time. EFM and TOCO placed. NST reactive with ctx every 4-6 mins. Pt given option to d/c home at this time or to receive some tylenol/fluid with plan for cervical recheck in 1-2 hours. Pt has opted to d/c home. She has routine f/u in clinic scheduled on. Reviewed warning signs and reasons for pt to call/seek care. She had no questions or concerns regarding information received. Orders placed to d/c home.       Pelvic Exam:  Cervix: 1/50/-2  Membranes: Intact    NST: reactive    Vitals:  Patient Vitals for the past 12 hrs:   Temp Pulse Resp BP   07/28/25 1544 98.5 °F (36.9 °C) 83 16 119/71       Labs:  No results found for this or any previous visit (from the past 24 hours).        Signed By:    DEQUAN Haile CNM                          July 28, 2025

## 2025-07-29 ENCOUNTER — ANESTHESIA EVENT (OUTPATIENT)
Facility: HOSPITAL | Age: 40
End: 2025-07-29
Payer: COMMERCIAL

## 2025-07-29 ENCOUNTER — ANESTHESIA (OUTPATIENT)
Facility: HOSPITAL | Age: 40
End: 2025-07-29
Payer: COMMERCIAL

## 2025-07-29 ENCOUNTER — HOSPITAL ENCOUNTER (INPATIENT)
Facility: HOSPITAL | Age: 40
LOS: 3 days | Discharge: HOME | End: 2025-08-01
Attending: OBSTETRICS & GYNECOLOGY | Admitting: OBSTETRICS & GYNECOLOGY
Payer: COMMERCIAL

## 2025-07-29 PROBLEM — D56.3 BETA THALASSEMIA MINOR: Status: ACTIVE | Noted: 2025-07-29

## 2025-07-29 PROBLEM — O09.529 ADVANCED MATERNAL AGE IN MULTIGRAVIDA: Status: ACTIVE | Noted: 2025-07-29

## 2025-07-29 PROBLEM — Z98.891 HISTORY OF CESAREAN DELIVERY: Status: ACTIVE | Noted: 2025-07-29

## 2025-07-29 PROBLEM — E03.9 HYPOTHYROIDISM: Status: ACTIVE | Noted: 2025-07-29

## 2025-07-29 LAB
ABO + RH BLD: NORMAL
AMNIOTEST, POC: NORMAL
BASOPHILS # BLD: 0.05 K/UL (ref 0–0.1)
BASOPHILS NFR BLD: 0.4 % (ref 0–2)
BLOOD GROUP ANTIBODIES SERPL: NORMAL
DIFFERENTIAL METHOD BLD: ABNORMAL
EOSINOPHIL # BLD: 0.09 K/UL (ref 0–0.4)
EOSINOPHIL NFR BLD: 0.8 % (ref 0–5)
ERYTHROCYTE [DISTWIDTH] IN BLOOD BY AUTOMATED COUNT: 19.8 % (ref 11.6–14.5)
HCT VFR BLD AUTO: 35.6 % (ref 35–45)
HGB BLD-MCNC: 11.3 G/DL (ref 12–16)
IMM GRANULOCYTES # BLD AUTO: 0.06 K/UL (ref 0–0.04)
IMM GRANULOCYTES NFR BLD AUTO: 0.5 % (ref 0–0.5)
LYMPHOCYTES # BLD: 2.13 K/UL (ref 0.9–3.3)
LYMPHOCYTES NFR BLD: 17.9 % (ref 21–52)
Lab: NORMAL
MCH RBC QN AUTO: 18.4 PG (ref 24–34)
MCHC RBC AUTO-ENTMCNC: 31.7 G/DL (ref 31–37)
MCV RBC AUTO: 57.9 FL (ref 78–100)
MONOCYTES # BLD: 1.1 K/UL (ref 0.05–1.2)
MONOCYTES NFR BLD: 9.3 % (ref 3–10)
NEGATIVE QC PASS/FAIL: NORMAL
NEUTS SEG # BLD: 8.45 K/UL (ref 1.8–8)
NEUTS SEG NFR BLD: 71.1 % (ref 40–73)
NRBC # BLD: 0 K/UL (ref 0–0.01)
NRBC BLD-RTO: 0 PER 100 WBC
PLATELET # BLD AUTO: 211 K/UL (ref 135–420)
POSITIVE QC PASS/FAIL: NORMAL
RBC # BLD AUTO: 6.15 M/UL (ref 4.2–5.3)
SPECIMEN EXP DATE BLD: NORMAL
WBC # BLD AUTO: 11.9 K/UL (ref 4.6–13.2)

## 2025-07-29 PROCEDURE — 2580000003 HC RX 258: Performed by: REGISTERED NURSE

## 2025-07-29 PROCEDURE — 6360000002 HC RX W HCPCS: Performed by: REGISTERED NURSE

## 2025-07-29 PROCEDURE — 85025 COMPLETE CBC W/AUTO DIFF WBC: CPT

## 2025-07-29 PROCEDURE — 4A1HXCZ MONITORING OF PRODUCTS OF CONCEPTION, CARDIAC RATE, EXTERNAL APPROACH: ICD-10-PCS | Performed by: OBSTETRICS & GYNECOLOGY

## 2025-07-29 PROCEDURE — 2580000003 HC RX 258: Performed by: ADVANCED PRACTICE MIDWIFE

## 2025-07-29 PROCEDURE — 3700000156 HC EPIDURAL ANESTHESIA

## 2025-07-29 PROCEDURE — 6370000000 HC RX 637 (ALT 250 FOR IP): Performed by: ADVANCED PRACTICE MIDWIFE

## 2025-07-29 PROCEDURE — 86901 BLOOD TYPING SEROLOGIC RH(D): CPT

## 2025-07-29 PROCEDURE — 1100000000 HC RM PRIVATE

## 2025-07-29 PROCEDURE — 6360000002 HC RX W HCPCS: Performed by: ADVANCED PRACTICE MIDWIFE

## 2025-07-29 PROCEDURE — 99213 OFFICE O/P EST LOW 20 MIN: CPT

## 2025-07-29 PROCEDURE — 86900 BLOOD TYPING SEROLOGIC ABO: CPT

## 2025-07-29 PROCEDURE — 3700000025 EPIDURAL BLOCK: Performed by: STUDENT IN AN ORGANIZED HEALTH CARE EDUCATION/TRAINING PROGRAM

## 2025-07-29 PROCEDURE — 00HU33Z INSERTION OF INFUSION DEVICE INTO SPINAL CANAL, PERCUTANEOUS APPROACH: ICD-10-PCS | Performed by: REGISTERED NURSE

## 2025-07-29 PROCEDURE — 7210000100 HC LABOR FEE PER 1 HR

## 2025-07-29 PROCEDURE — 86850 RBC ANTIBODY SCREEN: CPT

## 2025-07-29 PROCEDURE — 51702 INSERT TEMP BLADDER CATH: CPT

## 2025-07-29 PROCEDURE — 86592 SYPHILIS TEST NON-TREP QUAL: CPT

## 2025-07-29 RX ORDER — LOPERAMIDE HYDROCHLORIDE 2 MG/1
2 CAPSULE ORAL PRN
Status: DISCONTINUED | OUTPATIENT
Start: 2025-07-29 | End: 2025-07-30 | Stop reason: HOSPADM

## 2025-07-29 RX ORDER — TRANEXAMIC ACID 10 MG/ML
1000 INJECTION, SOLUTION INTRAVENOUS
Status: DISCONTINUED | OUTPATIENT
Start: 2025-07-29 | End: 2025-07-30 | Stop reason: HOSPADM

## 2025-07-29 RX ORDER — ROPIVACAINE HYDROCHLORIDE 2 MG/ML
INJECTION, SOLUTION EPIDURAL; INFILTRATION; PERINEURAL
Status: DISCONTINUED | OUTPATIENT
Start: 2025-07-29 | End: 2025-07-30 | Stop reason: SDUPTHER

## 2025-07-29 RX ORDER — CARBOPROST TROMETHAMINE 250 UG/ML
250 INJECTION, SOLUTION INTRAMUSCULAR PRN
Status: DISCONTINUED | OUTPATIENT
Start: 2025-07-29 | End: 2025-07-30 | Stop reason: HOSPADM

## 2025-07-29 RX ORDER — LEVOTHYROXINE SODIUM 100 UG/1
100 TABLET ORAL DAILY
Status: DISCONTINUED | OUTPATIENT
Start: 2025-07-29 | End: 2025-08-01 | Stop reason: HOSPADM

## 2025-07-29 RX ORDER — TERBUTALINE SULFATE 1 MG/ML
0.25 INJECTION SUBCUTANEOUS
Status: DISCONTINUED | OUTPATIENT
Start: 2025-07-29 | End: 2025-07-30 | Stop reason: HOSPADM

## 2025-07-29 RX ORDER — LIDOCAINE HYDROCHLORIDE AND EPINEPHRINE 20; 5 MG/ML; UG/ML
INJECTION, SOLUTION EPIDURAL; INFILTRATION; INTRACAUDAL; PERINEURAL
Status: DISCONTINUED | OUTPATIENT
Start: 2025-07-29 | End: 2025-07-30 | Stop reason: SDUPTHER

## 2025-07-29 RX ORDER — ACETAMINOPHEN 325 MG/1
650 TABLET ORAL EVERY 4 HOURS PRN
Status: DISCONTINUED | OUTPATIENT
Start: 2025-07-29 | End: 2025-07-30 | Stop reason: HOSPADM

## 2025-07-29 RX ORDER — SODIUM CHLORIDE, SODIUM LACTATE, POTASSIUM CHLORIDE, AND CALCIUM CHLORIDE .6; .31; .03; .02 G/100ML; G/100ML; G/100ML; G/100ML
500 INJECTION, SOLUTION INTRAVENOUS PRN
Status: DISCONTINUED | OUTPATIENT
Start: 2025-07-29 | End: 2025-07-30 | Stop reason: HOSPADM

## 2025-07-29 RX ORDER — SODIUM CHLORIDE, SODIUM LACTATE, POTASSIUM CHLORIDE, AND CALCIUM CHLORIDE .6; .31; .03; .02 G/100ML; G/100ML; G/100ML; G/100ML
500 INJECTION, SOLUTION INTRAVENOUS ONCE
Status: COMPLETED | OUTPATIENT
Start: 2025-07-29 | End: 2025-07-30

## 2025-07-29 RX ORDER — LIDOCAINE HYDROCHLORIDE 10 MG/ML
30 INJECTION, SOLUTION EPIDURAL; INFILTRATION; INTRACAUDAL; PERINEURAL PRN
Status: DISCONTINUED | OUTPATIENT
Start: 2025-07-29 | End: 2025-07-30

## 2025-07-29 RX ORDER — ONDANSETRON 2 MG/ML
4 INJECTION INTRAMUSCULAR; INTRAVENOUS EVERY 6 HOURS PRN
Status: DISCONTINUED | OUTPATIENT
Start: 2025-07-29 | End: 2025-07-30 | Stop reason: SDUPTHER

## 2025-07-29 RX ORDER — SEVOFLURANE 250 ML/250ML
1 LIQUID RESPIRATORY (INHALATION) CONTINUOUS PRN
Status: DISCONTINUED | OUTPATIENT
Start: 2025-07-29 | End: 2025-07-30 | Stop reason: HOSPADM

## 2025-07-29 RX ORDER — METHYLERGONOVINE MALEATE 0.2 MG/ML
200 INJECTION INTRAVENOUS PRN
Status: DISCONTINUED | OUTPATIENT
Start: 2025-07-29 | End: 2025-07-30 | Stop reason: HOSPADM

## 2025-07-29 RX ORDER — ACETAMINOPHEN 500 MG
1000 TABLET ORAL
Status: COMPLETED | OUTPATIENT
Start: 2025-07-29 | End: 2025-07-29

## 2025-07-29 RX ORDER — ONDANSETRON 2 MG/ML
4 INJECTION INTRAMUSCULAR; INTRAVENOUS EVERY 6 HOURS PRN
OUTPATIENT
Start: 2025-07-29

## 2025-07-29 RX ORDER — SODIUM CHLORIDE, SODIUM LACTATE, POTASSIUM CHLORIDE, CALCIUM CHLORIDE 600; 310; 30; 20 MG/100ML; MG/100ML; MG/100ML; MG/100ML
INJECTION, SOLUTION INTRAVENOUS CONTINUOUS
Status: DISCONTINUED | OUTPATIENT
Start: 2025-07-29 | End: 2025-07-30 | Stop reason: HOSPADM

## 2025-07-29 RX ORDER — ONDANSETRON 4 MG/1
4 TABLET, ORALLY DISINTEGRATING ORAL EVERY 6 HOURS PRN
OUTPATIENT
Start: 2025-07-29

## 2025-07-29 RX ORDER — CLINDAMYCIN PHOSPHATE 900 MG/50ML
900 INJECTION, SOLUTION INTRAVENOUS EVERY 8 HOURS
Status: DISCONTINUED | OUTPATIENT
Start: 2025-07-29 | End: 2025-07-31

## 2025-07-29 RX ORDER — SODIUM CHLORIDE, SODIUM LACTATE, POTASSIUM CHLORIDE, AND CALCIUM CHLORIDE .6; .31; .03; .02 G/100ML; G/100ML; G/100ML; G/100ML
1000 INJECTION, SOLUTION INTRAVENOUS PRN
Status: DISCONTINUED | OUTPATIENT
Start: 2025-07-29 | End: 2025-07-30 | Stop reason: HOSPADM

## 2025-07-29 RX ORDER — LIDOCAINE HYDROCHLORIDE 10 MG/ML
INJECTION, SOLUTION INFILTRATION; PERINEURAL
Status: DISCONTINUED | OUTPATIENT
Start: 2025-07-29 | End: 2025-07-30 | Stop reason: SDUPTHER

## 2025-07-29 RX ORDER — MISOPROSTOL 200 UG/1
400 TABLET ORAL PRN
OUTPATIENT
Start: 2025-07-29

## 2025-07-29 RX ADMIN — SODIUM CHLORIDE, SODIUM LACTATE, POTASSIUM CHLORIDE, AND CALCIUM CHLORIDE: 600; 310; 30; 20 INJECTION, SOLUTION INTRAVENOUS at 09:07

## 2025-07-29 RX ADMIN — CLINDAMYCIN PHOSPHATE 900 MG: 900 INJECTION, SOLUTION INTRAVENOUS at 22:06

## 2025-07-29 RX ADMIN — LIDOCAINE HYDROCHLORIDE 6 MG: 10 INJECTION, SOLUTION INFILTRATION; PERINEURAL at 09:52

## 2025-07-29 RX ADMIN — ACETAMINOPHEN 1000 MG: 500 TABLET ORAL at 21:24

## 2025-07-29 RX ADMIN — GENTAMICIN SULFATE 442.4 MG: 40 INJECTION, SOLUTION INTRAMUSCULAR; INTRAVENOUS at 21:27

## 2025-07-29 RX ADMIN — FENTANYL CITRATE 12 ML/HR: 0.05 INJECTION, SOLUTION INTRAMUSCULAR; INTRAVENOUS at 10:00

## 2025-07-29 RX ADMIN — LIDOCAINE HYDROCHLORIDE,EPINEPHRINE BITARTRATE 3 ML: 20; .005 INJECTION, SOLUTION EPIDURAL; INFILTRATION; INTRACAUDAL; PERINEURAL at 09:56

## 2025-07-29 RX ADMIN — OXYTOCIN 2 MILLI-UNITS/MIN: 10 INJECTION, SOLUTION INTRAMUSCULAR; INTRAVENOUS at 21:57

## 2025-07-29 RX ADMIN — ROPIVACAINE HYDROCHLORIDE 8 ML: 2 INJECTION EPIDURAL; INFILTRATION; PERINEURAL at 18:56

## 2025-07-29 RX ADMIN — SODIUM CHLORIDE, SODIUM LACTATE, POTASSIUM CHLORIDE, AND CALCIUM CHLORIDE: 600; 310; 30; 20 INJECTION, SOLUTION INTRAVENOUS at 18:33

## 2025-07-29 RX ADMIN — LIDOCAINE HYDROCHLORIDE,EPINEPHRINE BITARTRATE 4 ML: 20; .005 INJECTION, SOLUTION EPIDURAL; INFILTRATION; INTRACAUDAL; PERINEURAL at 09:57

## 2025-07-29 RX ADMIN — LEVOTHYROXINE SODIUM 100 MCG: 0.1 TABLET ORAL at 08:15

## 2025-07-29 RX ADMIN — FENTANYL CITRATE 12 ML/HR: 0.05 INJECTION, SOLUTION INTRAMUSCULAR; INTRAVENOUS at 16:49

## 2025-07-29 RX ADMIN — SODIUM CHLORIDE, SODIUM LACTATE, POTASSIUM CHLORIDE, AND CALCIUM CHLORIDE 1000 ML: .6; .31; .03; .02 INJECTION, SOLUTION INTRAVENOUS at 09:17

## 2025-07-29 RX ADMIN — FENTANYL CITRATE 12 ML/HR: 0.05 INJECTION, SOLUTION INTRAMUSCULAR; INTRAVENOUS at 23:18

## 2025-07-29 RX ADMIN — SODIUM CHLORIDE, SODIUM LACTATE, POTASSIUM CHLORIDE, AND CALCIUM CHLORIDE: 600; 310; 30; 20 INJECTION, SOLUTION INTRAVENOUS at 10:19

## 2025-07-29 NOTE — ANESTHESIA PROCEDURE NOTES
Epidural Block    Patient location during procedure: OB  Start time: 7/29/2025 9:52 AM  End time: 7/29/2025 9:55 AM  Reason for block: labor epidural  Staffing  Performed: resident/CRNA   Anesthesiologist: Angel Paredes MD  Resident/CRNA: Maryjane Knott APRN - CRNA  Performed by: Maryjane Knott APRN - CRNA  Authorized by: Maryjane Knott APRN - CRNA    Epidural  Patient position: sitting  Prep: site prepped and draped  Patient monitoring: continuous pulse ox and frequent blood pressure checks  Approach: midline  Location: L4-5  Injection technique: SILVANO saline  Guidance: ultrasound guided  Provider prep: mask and sterile gloves  Needle  Needle type: Tuohy   Needle gauge: 17 G  Needle length: 3.5 in  Needle insertion depth: 6.5 cm  Catheter type: end hole  Catheter size: 19 G  Catheter at skin depth: 12 cm  Test dose: negativeCatheter Secured: tegaderm and tape  Assessment  Hemodynamics: stable  Attempts: 1  Outcomes: uncomplicated and patient tolerated procedure well  Preanesthetic Checklist  Completed: patient identified, IV checked, site marked, risks and benefits discussed, surgical/procedural consents, equipment checked, pre-op evaluation, timeout performed, anesthesia consent given, oxygen available, monitors applied/VS acknowledged, fire risk safety assessment completed and verbalized and blood product R/B/A discussed and consented

## 2025-07-29 NOTE — H&P
History & Physical    Name: Clyde Rich MRN: [id number]  SSN: [id number]    YOB: 1985  Age: 40 y.o.  Sex: female        Subjective:     Estimated Date of Delivery: 25  OB History    Para Term  AB Living   2 1 1   1   SAB IAB Ectopic Molar Multiple Live Births        1      # Outcome Date GA Lbr Bhavesh/2nd Weight Sex Type Anes PTL Lv   2 Current            1 Term 22 41w0d 06:50 / 03:47 3.385 kg F CS-LTranv EPI N LEEANN      Complications: Fetal Intolerance, Failure to Progress in Second Stage       Clyde Rich is 40 y.o., , at 41w0d presenting to L&D for active labor. 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 She is GBS negative. Please see prenatal records for details.    Past Medical History:   Diagnosis Date    Abnormal Pap smear of cervix     Beta thalassemia trait     Heart abnormality     pt states \"prolapse of heart valve, on record\"    Thyroid activity decreased      Past Surgical History:   Procedure Laterality Date     SECTION, LOW TRANSVERSE Bilateral      Social History     Occupational History    Not on file   Tobacco Use    Smoking status: Never    Smokeless tobacco: Never   Vaping Use    Vaping status: Never Used   Substance and Sexual Activity    Alcohol use: Not Currently    Drug use: Never    Sexual activity: Yes     Partners: Male     No family history on file.    Allergies   Allergen Reactions    Salicylic Acid Swelling    Amoxicillin Hives     Prior to Admission medications    Medication Sig Start Date End Date Taking? Authorizing Provider   Jutbab-Zwdhrwobt-Ccxv-Fish Oil (PRENATAL + COMPLETE MULTI) 0.267 & 373 MG THPK Take by mouth    ProviderAkira MD   ferrous sulfate (IRON 325) 325 (65 Fe) MG tablet Take 1 tablet by mouth daily (with breakfast)    Akira Dave MD   vitamin D 50 MCG ( UT) CAPS capsule Take 1 capsule by mouth daily

## 2025-07-29 NOTE — ANESTHESIA PRE PROCEDURE
(PITOCIN) 30 units in 500 mL infusion  1-20 ester-units/min IntraVENous Continuous LordLamxi, APRN - CNM   Stopped at 25 0240     Facility-Administered Medications Ordered in Other Encounters   Medication Dose Route Frequency Provider Last Rate Last Admin    lidocaine 1 % injection   IntraVENous Once PRN May-Such, Maryjane A, APRN - CRNA   6 mg at 25 0952    Lidocaine-EPINEPHrine (PF) 2 percent-1:[id number] injection   Epidural Once PRN May-Such, Maryjane A, APRN - CRNA   4 mL at 25 0957    ROPivacaine (NAROPIN) 0.2% injection 0.2%   Epidural Once PRN May-Such, Maryjane A, APRN - CRNA   8 mL at 25 1856       Allergies:    Allergies   Allergen Reactions    Salicylic Acid Swelling    Amoxicillin Hives       Problem List:    Patient Active Problem List   Diagnosis Code    Pregnant Z34.90    Term pregnancy Z34.90     delivery delivered O82    Intrauterine pregnancy Z34.90    History of  delivery Z98.891    Beta thalassemia minor D56.3    Hypothyroidism E03.9    Advanced maternal age in multigravida O09.529       Past Medical History:        Diagnosis Date    Abnormal Pap smear of cervix     Beta thalassemia trait     Heart abnormality     pt states \"prolapse of heart valve, on record\"    Thyroid activity decreased        Past Surgical History:        Procedure Laterality Date     SECTION, LOW TRANSVERSE Bilateral        Social History:    Social History     Tobacco Use    Smoking status: Never    Smokeless tobacco: Never   Substance Use Topics    Alcohol use: Not Currently                                Counseling given: Not Answered      Vital Signs (Current):   Vitals:    25 0013 25 0018 25 0021 25 0023   BP:       Pulse:       Resp:       Temp:       TempSrc:       SpO2: 94% 93% 93% 94%                                              BP Readings from Last 3 Encounters:   25 (!) 85/50   25 119/71       NPO Status: Time of last liquid

## 2025-07-29 NOTE — PROGRESS NOTES
0336-  arrives to L&D for c/o ctx 3 minutes apart. Reports thin fluid on underwear. Amnisure positive.  0408- SVE , bulging bag. RN unable to feel presenting part.   0413- Midwife aware of pt.

## 2025-07-29 NOTE — PROGRESS NOTES
0835- S. Hedy, CRNA at bedside explaining epidural procedure, side effects, risks, benefits, and positioning. Patient verbalizes understanding.  Time-out: 0950  Procedure start: 0952  Catheter in, needle out: 0955  Test dose: 0956  Loading dose: 0956  Patient connected to pump: 1000

## 2025-07-29 NOTE — PROGRESS NOTES
Labor Progress Note    States she is feeling intense and painful contractions. She desires epidural now.       Physical Exam:  BP (!) 156/70   Pulse 98   Temp 97.5 °F (36.4 °C) (Axillary)   Resp 20   SpO2 98%    Cervical Exam:  7//-2, cephalic, BOWR-c  Uterine Activity: Q3-4min  Fetal Heart Rate: Baseline: 150 per minute  Variability: moderate  Accelerations: no  Decelerations: variable    Assessment/Plan:    Patient Active Problem List   Diagnosis    Pregnant    Term pregnancy     delivery delivered    Intrauterine pregnancy    History of  delivery    Beta thalassemia minor    Hypothyroidism    Advanced maternal age in multigravida        Bolus started for epidural. Anesthesia notified.   Continue to monitor labor progress and fetal wellbeing  Anticipate     Brigid Horton, DEQUAN, CNM  2025

## 2025-07-29 NOTE — PLAN OF CARE
Problem: Pain  Goal: Verbalizes/displays adequate comfort level or baseline comfort level  Outcome: Progressing  Flowsheets (Taken 2025)  Verbalizes/displays adequate comfort level or baseline comfort level:   Encourage patient to monitor pain and request assistance   Assess pain using appropriate pain scale   Administer analgesics based on type and severity of pain and evaluate response   Implement non-pharmacological measures as appropriate and evaluate response   Consider cultural and social influences on pain and pain management   Notify Licensed Independent Practitioner if interventions unsuccessful or patient reports new pain     Problem: Vaginal Birth or  Section  Goal: Fetal and maternal status remain reassuring during the birth process  Description:  Birth OB-Pregnancy care plan goal which identifies if the fetal and maternal status remain reassuring during the birth process  Outcome: Progressing  Flowsheets (Taken 2025)  Fetal and Maternal Status Remain Reassuring During the Birth Process:   Monitor vital signs   Monitor fetal heart rate   Monitor uterine activity   Monitor labor progression (Vaginal delivery)     Problem: Infection - Adult  Goal: Absence of infection at discharge  Outcome: Progressing  Flowsheets (Taken 2025)  Absence of infection at discharge:   Assess and monitor for signs and symptoms of infection   Monitor lab/diagnostic results   Monitor all insertion sites i.e., indwelling lines, tubes and drains   Administer medications as ordered   Instruct and encourage patient and family to use good hand hygiene technique  Goal: Absence of infection during hospitalization  Outcome: Progressing  Flowsheets (Taken 2025)  Absence of infection during hospitalization:   Assess and monitor for signs and symptoms of infection   Monitor lab/diagnostic results   Monitor all insertion sites i.e., indwelling lines, tubes and drains   Administer medications

## 2025-07-29 NOTE — PROGRESS NOTES
Labor Progress Note    States she is feeling ready to move her labor along. She would like to have nitrous for AROM of fore bag.       Physical Exam:  /76   Pulse 76   Temp 98 °F (36.7 °C) (Oral)   Resp 18   SpO2 98%    Cervical Exam:  6/-2, cephalic, BOWR-c fore bag released for moderate amount of clear fluid.  Uterine Activity: Q4-5min  Fetal Heart Rate: Baseline: 140 per minute  Variability: moderate  Accelerations: no  Decelerations: variable    Assessment/Plan:    Patient Active Problem List   Diagnosis    Pregnant    Term pregnancy     delivery delivered    Intrauterine pregnancy    History of  delivery    Beta thalassemia minor    Hypothyroidism    Advanced maternal age in multigravida        AROM of fore bag for labor augmentation  Patient may use nitrous  Continue to monitor labor progress and fetal wellbeing  Anticipate     DEQUAN Cartagena, CNM  2025

## 2025-07-30 LAB
CREAT SERPL-MCNC: 1.38 MG/DL (ref 0.6–1.3)
RPR SER QL: NONREACTIVE

## 2025-07-30 PROCEDURE — 88307 TISSUE EXAM BY PATHOLOGIST: CPT

## 2025-07-30 PROCEDURE — 2500000003 HC RX 250 WO HCPCS: Performed by: REGISTERED NURSE

## 2025-07-30 PROCEDURE — 2500000003 HC RX 250 WO HCPCS: Performed by: ADVANCED PRACTICE MIDWIFE

## 2025-07-30 PROCEDURE — 2580000003 HC RX 258: Performed by: ADVANCED PRACTICE MIDWIFE

## 2025-07-30 PROCEDURE — 3609079900 HC CESAREAN SECTION: Performed by: OBSTETRICS & GYNECOLOGY

## 2025-07-30 PROCEDURE — 36415 COLL VENOUS BLD VENIPUNCTURE: CPT

## 2025-07-30 PROCEDURE — 6370000000 HC RX 637 (ALT 250 FOR IP): Performed by: ADVANCED PRACTICE MIDWIFE

## 2025-07-30 PROCEDURE — 6360000002 HC RX W HCPCS: Performed by: REGISTERED NURSE

## 2025-07-30 PROCEDURE — 10907ZC DRAINAGE OF AMNIOTIC FLUID, THERAPEUTIC FROM PRODUCTS OF CONCEPTION, VIA NATURAL OR ARTIFICIAL OPENING: ICD-10-PCS | Performed by: OBSTETRICS & GYNECOLOGY

## 2025-07-30 PROCEDURE — 7210000100 HC LABOR FEE PER 1 HR

## 2025-07-30 PROCEDURE — 7100000000 HC PACU RECOVERY - FIRST 15 MIN

## 2025-07-30 PROCEDURE — 6360000002 HC RX W HCPCS: Performed by: ADVANCED PRACTICE MIDWIFE

## 2025-07-30 PROCEDURE — 59025 FETAL NON-STRESS TEST: CPT

## 2025-07-30 PROCEDURE — 7100000001 HC PACU RECOVERY - ADDTL 15 MIN

## 2025-07-30 PROCEDURE — 82565 ASSAY OF CREATININE: CPT

## 2025-07-30 PROCEDURE — 3700000001 HC ADD 15 MINUTES (ANESTHESIA): Performed by: OBSTETRICS & GYNECOLOGY

## 2025-07-30 PROCEDURE — 7100000000 HC PACU RECOVERY - FIRST 15 MIN: Performed by: OBSTETRICS & GYNECOLOGY

## 2025-07-30 PROCEDURE — 7100000001 HC PACU RECOVERY - ADDTL 15 MIN: Performed by: OBSTETRICS & GYNECOLOGY

## 2025-07-30 PROCEDURE — 3700000000 HC ANESTHESIA ATTENDED CARE: Performed by: OBSTETRICS & GYNECOLOGY

## 2025-07-30 PROCEDURE — 1100000000 HC RM PRIVATE

## 2025-07-30 PROCEDURE — 2709999900 HC NON-CHARGEABLE SUPPLY: Performed by: OBSTETRICS & GYNECOLOGY

## 2025-07-30 RX ORDER — DOCUSATE SODIUM 100 MG/1
100 CAPSULE, LIQUID FILLED ORAL 2 TIMES DAILY
Status: DISCONTINUED | OUTPATIENT
Start: 2025-07-30 | End: 2025-08-01 | Stop reason: HOSPADM

## 2025-07-30 RX ORDER — SODIUM CHLORIDE 9 MG/ML
INJECTION, SOLUTION INTRAVENOUS PRN
Status: DISCONTINUED | OUTPATIENT
Start: 2025-07-30 | End: 2025-08-01 | Stop reason: HOSPADM

## 2025-07-30 RX ORDER — LOPERAMIDE HYDROCHLORIDE 2 MG/1
2 CAPSULE ORAL PRN
Status: DISCONTINUED | OUTPATIENT
Start: 2025-07-30 | End: 2025-08-01 | Stop reason: HOSPADM

## 2025-07-30 RX ORDER — ACETAMINOPHEN 500 MG
1000 TABLET ORAL EVERY 8 HOURS SCHEDULED
Status: DISCONTINUED | OUTPATIENT
Start: 2025-07-30 | End: 2025-08-01 | Stop reason: HOSPADM

## 2025-07-30 RX ORDER — METHYLERGONOVINE MALEATE 0.2 MG/ML
200 INJECTION INTRAVENOUS PRN
Status: DISCONTINUED | OUTPATIENT
Start: 2025-07-30 | End: 2025-08-01 | Stop reason: HOSPADM

## 2025-07-30 RX ORDER — ONDANSETRON 2 MG/ML
4 INJECTION INTRAMUSCULAR; INTRAVENOUS EVERY 6 HOURS PRN
Status: DISCONTINUED | OUTPATIENT
Start: 2025-07-30 | End: 2025-07-30 | Stop reason: HOSPADM

## 2025-07-30 RX ORDER — ONDANSETRON 2 MG/ML
4 INJECTION INTRAMUSCULAR; INTRAVENOUS EVERY 6 HOURS PRN
Status: DISCONTINUED | OUTPATIENT
Start: 2025-07-30 | End: 2025-08-01 | Stop reason: HOSPADM

## 2025-07-30 RX ORDER — METOCLOPRAMIDE HYDROCHLORIDE 5 MG/ML
10 INJECTION INTRAMUSCULAR; INTRAVENOUS ONCE
Status: COMPLETED | OUTPATIENT
Start: 2025-07-30 | End: 2025-07-30

## 2025-07-30 RX ORDER — ONDANSETRON 2 MG/ML
INJECTION INTRAMUSCULAR; INTRAVENOUS
Status: DISCONTINUED | OUTPATIENT
Start: 2025-07-30 | End: 2025-07-30 | Stop reason: SDUPTHER

## 2025-07-30 RX ORDER — ACETAMINOPHEN 325 MG/1
975 TABLET ORAL ONCE
Status: COMPLETED | OUTPATIENT
Start: 2025-07-30 | End: 2025-07-30

## 2025-07-30 RX ORDER — PHENYLEPHRINE HCL IN 0.9% NACL 1 MG/10 ML
SYRINGE (ML) INTRAVENOUS
Status: DISCONTINUED | OUTPATIENT
Start: 2025-07-30 | End: 2025-07-30 | Stop reason: SDUPTHER

## 2025-07-30 RX ORDER — CARBOPROST TROMETHAMINE 250 UG/ML
250 INJECTION, SOLUTION INTRAMUSCULAR PRN
Status: DISCONTINUED | OUTPATIENT
Start: 2025-07-30 | End: 2025-08-01 | Stop reason: HOSPADM

## 2025-07-30 RX ORDER — ONDANSETRON 4 MG/1
4 TABLET, ORALLY DISINTEGRATING ORAL EVERY 8 HOURS PRN
Status: DISCONTINUED | OUTPATIENT
Start: 2025-07-30 | End: 2025-08-01 | Stop reason: HOSPADM

## 2025-07-30 RX ORDER — GABAPENTIN 300 MG/1
300 CAPSULE ORAL ONCE
Status: COMPLETED | OUTPATIENT
Start: 2025-07-30 | End: 2025-07-30

## 2025-07-30 RX ORDER — SODIUM CHLORIDE, SODIUM LACTATE, POTASSIUM CHLORIDE, CALCIUM CHLORIDE 600; 310; 30; 20 MG/100ML; MG/100ML; MG/100ML; MG/100ML
INJECTION, SOLUTION INTRAVENOUS CONTINUOUS
Status: DISCONTINUED | OUTPATIENT
Start: 2025-07-30 | End: 2025-08-01 | Stop reason: HOSPADM

## 2025-07-30 RX ORDER — FAMOTIDINE 20 MG/1
20 TABLET, FILM COATED ORAL 2 TIMES DAILY
Status: DISCONTINUED | OUTPATIENT
Start: 2025-07-30 | End: 2025-08-01 | Stop reason: HOSPADM

## 2025-07-30 RX ORDER — TRANEXAMIC ACID 10 MG/ML
INJECTION, SOLUTION INTRAVENOUS
Status: DISCONTINUED | OUTPATIENT
Start: 2025-07-30 | End: 2025-07-30 | Stop reason: SDUPTHER

## 2025-07-30 RX ORDER — MORPHINE SULFATE 1 MG/ML
INJECTION, SOLUTION EPIDURAL; INTRATHECAL; INTRAVENOUS
Status: DISCONTINUED | OUTPATIENT
Start: 2025-07-30 | End: 2025-07-30 | Stop reason: SDUPTHER

## 2025-07-30 RX ORDER — SODIUM CHLORIDE, SODIUM LACTATE, POTASSIUM CHLORIDE, AND CALCIUM CHLORIDE .6; .31; .03; .02 G/100ML; G/100ML; G/100ML; G/100ML
1000 INJECTION, SOLUTION INTRAVENOUS ONCE
Status: DISCONTINUED | OUTPATIENT
Start: 2025-07-30 | End: 2025-07-30 | Stop reason: HOSPADM

## 2025-07-30 RX ORDER — SODIUM CHLORIDE 0.9 % (FLUSH) 0.9 %
5-40 SYRINGE (ML) INJECTION EVERY 12 HOURS SCHEDULED
Status: DISCONTINUED | OUTPATIENT
Start: 2025-07-30 | End: 2025-08-01 | Stop reason: HOSPADM

## 2025-07-30 RX ORDER — SODIUM CHLORIDE 0.9 % (FLUSH) 0.9 %
5-40 SYRINGE (ML) INJECTION PRN
Status: DISCONTINUED | OUTPATIENT
Start: 2025-07-30 | End: 2025-08-01 | Stop reason: HOSPADM

## 2025-07-30 RX ORDER — OXYCODONE HYDROCHLORIDE 5 MG/1
5 TABLET ORAL EVERY 4 HOURS PRN
Refills: 0 | Status: DISCONTINUED | OUTPATIENT
Start: 2025-07-30 | End: 2025-08-01 | Stop reason: HOSPADM

## 2025-07-30 RX ORDER — CITRIC ACID/SODIUM CITRATE 334-500MG
30 SOLUTION, ORAL ORAL ONCE
Status: COMPLETED | OUTPATIENT
Start: 2025-07-30 | End: 2025-07-30

## 2025-07-30 RX ORDER — TRANEXAMIC ACID 10 MG/ML
1000 INJECTION, SOLUTION INTRAVENOUS
Status: DISCONTINUED | OUTPATIENT
Start: 2025-07-30 | End: 2025-08-01 | Stop reason: HOSPADM

## 2025-07-30 RX ORDER — OXYCODONE HYDROCHLORIDE 5 MG/1
10 TABLET ORAL EVERY 4 HOURS PRN
Refills: 0 | Status: DISCONTINUED | OUTPATIENT
Start: 2025-07-30 | End: 2025-08-01 | Stop reason: HOSPADM

## 2025-07-30 RX ORDER — FENTANYL CITRATE 50 UG/ML
INJECTION, SOLUTION INTRAMUSCULAR; INTRAVENOUS
Status: DISCONTINUED | OUTPATIENT
Start: 2025-07-30 | End: 2025-07-30 | Stop reason: SDUPTHER

## 2025-07-30 RX ADMIN — GENTAMICIN SULFATE 442.4 MG: 40 INJECTION, SOLUTION INTRAMUSCULAR; INTRAVENOUS at 21:34

## 2025-07-30 RX ADMIN — ACETAMINOPHEN 1000 MG: 500 TABLET ORAL at 14:47

## 2025-07-30 RX ADMIN — Medication 667 ML/HR: at 04:38

## 2025-07-30 RX ADMIN — FAMOTIDINE 20 MG: 10 INJECTION, SOLUTION INTRAVENOUS at 03:40

## 2025-07-30 RX ADMIN — MORPHINE SULFATE 2 MG: 1 INJECTION, SOLUTION EPIDURAL; INTRATHECAL; INTRAVENOUS at 05:10

## 2025-07-30 RX ADMIN — FAMOTIDINE 20 MG: 20 TABLET, FILM COATED ORAL at 21:29

## 2025-07-30 RX ADMIN — LEVOTHYROXINE SODIUM 100 MCG: 0.1 TABLET ORAL at 08:49

## 2025-07-30 RX ADMIN — ACETAMINOPHEN 1000 MG: 500 TABLET ORAL at 21:29

## 2025-07-30 RX ADMIN — GABAPENTIN 300 MG: 300 CAPSULE ORAL at 03:40

## 2025-07-30 RX ADMIN — HYDROMORPHONE HYDROCHLORIDE 0.5 MG: 1 INJECTION, SOLUTION INTRAMUSCULAR; INTRAVENOUS; SUBCUTANEOUS at 05:04

## 2025-07-30 RX ADMIN — SODIUM CHLORIDE, SODIUM LACTATE, POTASSIUM CHLORIDE, AND CALCIUM CHLORIDE: 600; 310; 30; 20 INJECTION, SOLUTION INTRAVENOUS at 00:57

## 2025-07-30 RX ADMIN — AZITHROMYCIN MONOHYDRATE 500 MG: 500 INJECTION, POWDER, LYOPHILIZED, FOR SOLUTION INTRAVENOUS at 03:36

## 2025-07-30 RX ADMIN — SODIUM CHLORIDE, SODIUM LACTATE, POTASSIUM CHLORIDE, AND CALCIUM CHLORIDE 500 ML: .6; .31; .03; .02 INJECTION, SOLUTION INTRAVENOUS at 00:57

## 2025-07-30 RX ADMIN — ONDANSETRON 4 MG: 2 INJECTION, SOLUTION INTRAMUSCULAR; INTRAVENOUS at 04:39

## 2025-07-30 RX ADMIN — SODIUM CHLORIDE, SODIUM LACTATE, POTASSIUM CHLORIDE, AND CALCIUM CHLORIDE: 600; 310; 30; 20 INJECTION, SOLUTION INTRAVENOUS at 04:32

## 2025-07-30 RX ADMIN — LIDOCAINE HYDROCHLORIDE,EPINEPHRINE BITARTRATE 17 ML: 20; .005 INJECTION, SOLUTION EPIDURAL; INFILTRATION; INTRACAUDAL; PERINEURAL at 04:02

## 2025-07-30 RX ADMIN — CLINDAMYCIN PHOSPHATE 900 MG: 900 INJECTION, SOLUTION INTRAVENOUS at 15:03

## 2025-07-30 RX ADMIN — Medication 50 MCG: at 04:47

## 2025-07-30 RX ADMIN — SODIUM CITRATE AND CITRIC ACID MONOHYDRATE 30 ML: 1002; 1500 SOLUTION ORAL at 03:40

## 2025-07-30 RX ADMIN — TRANEXAMIC ACID 1 G: 10 INJECTION, SOLUTION INTRAVENOUS at 04:40

## 2025-07-30 RX ADMIN — OXYCODONE HYDROCHLORIDE 5 MG: 5 TABLET ORAL at 14:46

## 2025-07-30 RX ADMIN — Medication 200 MCG: at 04:18

## 2025-07-30 RX ADMIN — CLINDAMYCIN PHOSPHATE 900 MG: 900 INJECTION, SOLUTION INTRAVENOUS at 21:33

## 2025-07-30 RX ADMIN — SODIUM CHLORIDE, PRESERVATIVE FREE 10 ML: 5 INJECTION INTRAVENOUS at 21:30

## 2025-07-30 RX ADMIN — FENTANYL CITRATE 50 MCG: 50 INJECTION, SOLUTION INTRAMUSCULAR; INTRAVENOUS at 04:52

## 2025-07-30 RX ADMIN — Medication 2000 MG: at 04:20

## 2025-07-30 RX ADMIN — FAMOTIDINE 20 MG: 20 TABLET, FILM COATED ORAL at 09:00

## 2025-07-30 RX ADMIN — ACETAMINOPHEN 975 MG: 325 TABLET ORAL at 03:39

## 2025-07-30 RX ADMIN — DOCUSATE SODIUM 100 MG: 100 CAPSULE, LIQUID FILLED ORAL at 09:00

## 2025-07-30 RX ADMIN — FENTANYL CITRATE 50 MCG: 50 INJECTION, SOLUTION INTRAMUSCULAR; INTRAVENOUS at 04:42

## 2025-07-30 RX ADMIN — METOCLOPRAMIDE 10 MG: 5 INJECTION, SOLUTION INTRAMUSCULAR; INTRAVENOUS at 03:40

## 2025-07-30 RX ADMIN — Medication 100 MCG: at 04:43

## 2025-07-30 RX ADMIN — HYDROMORPHONE HYDROCHLORIDE 0.5 MG: 1 INJECTION, SOLUTION INTRAMUSCULAR; INTRAVENOUS; SUBCUTANEOUS at 04:59

## 2025-07-30 RX ADMIN — DOCUSATE SODIUM 100 MG: 100 CAPSULE, LIQUID FILLED ORAL at 21:29

## 2025-07-30 ASSESSMENT — PAIN SCALES - GENERAL: PAINLEVEL_OUTOF10: 1

## 2025-07-30 ASSESSMENT — PAIN DESCRIPTION - ORIENTATION: ORIENTATION: LOWER

## 2025-07-30 ASSESSMENT — PAIN DESCRIPTION - LOCATION
LOCATION: ABDOMEN
LOCATION: INCISION

## 2025-07-30 ASSESSMENT — PAIN - FUNCTIONAL ASSESSMENT: PAIN_FUNCTIONAL_ASSESSMENT: ACTIVITIES ARE NOT PREVENTED

## 2025-07-30 NOTE — LACTATION NOTE
This note was copied from a baby's chart.     07/30/25 0955   Visit Information   Lactation Consult Visit Type NICU Consult   Visit Length 15 minutes   Referral Received From Referred by MD   Reason for Visit Education   Breast Feeding History/Assessment   Left Breast Soft   Left Nipple Protrude   Right Nipple Protrude   Right Breast Soft   Breastfeeding History Yes   Longest duration (#) 2   Longest Duration months   Complications Yes (Comment)  (milk production)   Care Plan/Breast Care   Breast Care Pumping supply provided       Set mom up with double electric breast pump and educated on how to use initiation mode, pump hygiene, and safe milk storage due to infant NICU admission. Mom to pump q 3 hours for 15 minutes on initiation mode. Nipples measured for accurate flange fit. Resources provided. Mom verbalized understanding and no questions at this time.

## 2025-07-30 NOTE — PROGRESS NOTES
Department of Obstetrics and Gynecology  Labor and Delivery   Labor Progress Note          Fetal heart rate:       Baseline Heart Rate:  140        Accelerations:  present       Long Term Variability:  moderate       Decelerations:  late, variable           ASSESSMENT & PLAN:    Patient and significant other discussed and would like to proceed with  delivery. Dr. Encarnacion and OR staff notified. All questions answered. No other concerns at this time.         DEQUAN Morrison CNM

## 2025-07-30 NOTE — OP NOTE
[address]  [address]                            OPERATIVE REPORT      PATIENT NAME: ZHANG CONN               : 1985  MED REC NO: [id number]                       ROOM:   ACCOUNT NO: [id number]                       ADMIT DATE: 2025  PROVIDER: Cristal Encarnacion MD    DATE OF SERVICE:  2025    PREOPERATIVE DIAGNOSES:       1. Intrauterine pregnancy at 41 weeks and 1 day.     2. History of previous .     3. Advanced maternal age.     4. Hypothyroidism.     5. Beta thalassemia minor.     6. GBS negative.     7. Obesity, BMI 37.     8. Chorioamnionitis.     9. Nonreassuring fetal status.     10. Failure to progress (9 cm).    POSTOPERATIVE DIAGNOSES:       1. Intrauterine pregnancy at 41 weeks and 1 day.     2. History of previous .     3. Advanced maternal age.     4. Hypothyroidism.     5. Beta thalassemia minor.     6. GBS negative.     7. Obesity, BMI 37.     8. Chorioamnionitis.     9. Nonreassuring fetal status.     10. Failure to progress (9 cm).    PROCEDURES PERFORMED:  Repeat low transverse  section via Pfannenstiel skin incision.    SURGEON:  Cristal Encarnacion MD    ASSISTANT:  James.    ANESTHESIA:  Epidural.    ESTIMATED BLOOD LOSS:  QBL, 980.    SPECIMENS REMOVED:  Placenta.    INTRAOPERATIVE FINDINGS:  A viable baby boy at vertex presentation.  Nuchal cord x2 reduced.  Thick meconium.  Apgars 8 and 8.  Weight pending.  Arterial pH 7.108, base excess -10.3.  Normal appearing uterus, tubes, and ovaries bilaterally.  Placenta; meconium-stained, normal configuration, three-vessel cord, intact.     COMPLICATIONS:  None.    IMPLANTS:  ***    INDICATIONS:  This is a 40-year-old G2, P1-0-0-1 AMA patient who presented at 41 weeks and 0 days to Labor and Delivery in active labor.  She has a history of  due to failure to descend, beta thalassemia minor, hypothyroidism.  She was augmented  delivered onto the surgical field and wrapped with a moist lap with atony noted.  Vigorous fundal massage was performed.  The patient did receive a dose of tranexamic acid as well as Pitocin started.  The uterus was cleared of all clots and debris.  Uterine incision was closed with a 0 Vicryl in a running fashion with a 2nd imbricating layer performed to obtain excellent hemostasis.  The abdomen was copiously irrigated.  The uterus was placed back into the abdomen.  Excellent hemostasis was noted prior to fascial closure with 0 PDS in a running fashion.  Subcutaneous tissue was reapproximated with 2-0 Vicryl interrupted x3 and skin was closed with a 3-0 Vicryl on a Vinh in a subcuticular fashion followed by surgical glue and pressure dressing.    The patient tolerated the procedure well.  Sponge, lap, needle, instrument counts were correct x2.  She was given antibiotics prior to surgical start time and taken to recovery area in stable condition.    ANESTHESIOLOGIST:  Denny Rascon MD    IV FLUIDS:  600 mL of lactated Ringer's.    URINE OUTPUT:  200 mL of clear urine at the end of procedure.        PATRICK ENCARNACION MD      TXT/AQS  D:  07/30/2025 05:34:33  T:  07/30/2025 06:15:03  JOB #:  [id number]    CC:   Patrick Encarnacion MD

## 2025-07-30 NOTE — PROGRESS NOTES
Department of Obstetrics and Gynecology  Labor and Delivery   Labor Progress Note      SUBJECTIVE:  Patient resting in bed. No concerns    OBJECTIVE:      Vitals:    /67   Pulse 92   Temp 98.6 °F (37 °C) (Oral)   Resp 18   Ht 5' 4\" (1.626 m)   Wt 163 lb (73.9 kg)   SpO2 100%   BMI 27.98 kg/m²         Fetal heart rate:       Baseline Heart Rate:  140        Accelerations:  present       Long Term Variability:  moderate       Decelerations:  late, variable         Contraction frequency: 1-4 minutes    Fetal Presentation:  Cephalic      Membranes:  Ruptured meconium stained    ASSESSMENT & PLAN:      Continuous EFM  Pitocin stopped at this time  Strip reviewed by Dr. Encarnacion    Discussed current status with patient. Category II tracing with decels noted. Discussed turning pitocin off and concerns regarding progression to complete and pushing stage. Answered all questions to patient satisfaction. Recommended and offered RLTCS at this time. Patient to discuss with significant other.        DEQUAN Morrison - SHADE

## 2025-07-30 NOTE — ANESTHESIA POSTPROCEDURE EVALUATION
Post-Anesthesia Evaluation and Assessment    Cardiovascular Function/Vital Signs  Visit Vitals  BP (!) 92/56   Pulse 73   Temp 36.9 °C (98.4 °F) (Oral)   Resp 16   SpO2 97%   Breastfeeding Unknown       Patient is status post Procedure(s):   SECTION.    Nausea/Vomiting: Controlled.    Postoperative hydration reviewed and adequate.    Pain:      Managed.    Neurological Status:       At baseline.    Mental Status and Level of Consciousness: Arousable.    Pulmonary Status:       Adequate oxygenation and airway patent.    Complications related to anesthesia: None    Post-anesthesia assessment completed. No concerns.    Patient has met all discharge requirements.    Signed By: DEQUAN Barnett - ELLIOTT    2025

## 2025-07-30 NOTE — PLAN OF CARE
Problem: Pain  Goal: Verbalizes/displays adequate comfort level or baseline comfort level  Outcome: Progressing  Flowsheets  Taken 2025 0830 by Meryl Abbott RN  Verbalizes/displays adequate comfort level or baseline comfort level:   Encourage patient to monitor pain and request assistance   Assess pain using appropriate pain scale   Administer analgesics based on type and severity of pain and evaluate response   Implement non-pharmacological measures as appropriate and evaluate response  Taken 2025 2311 by Jayne Reyes RN  Verbalizes/displays adequate comfort level or baseline comfort level: Encourage patient to monitor pain and request assistance     Problem: Vaginal Birth or  Section  Goal: Fetal and maternal status remain reassuring during the birth process  Description:  Birth OB-Pregnancy care plan goal which identifies if the fetal and maternal status remain reassuring during the birth process  Outcome: Progressing     Problem: Infection - Adult  Goal: Absence of infection at discharge  Outcome: Progressing  Goal: Absence of infection during hospitalization  Outcome: Progressing     Problem: Safety - Adult  Goal: Free from fall injury  Outcome: Progressing     Problem: Discharge Planning  Goal: Discharge to home or other facility with appropriate resources  Outcome: Progressing     Problem: Chronic Conditions and Co-morbidities  Goal: Patient's chronic conditions and co-morbidity symptoms are monitored and maintained or improved  Outcome: Progressing

## 2025-07-30 NOTE — PROGRESS NOTES
Department of Obstetrics and Gynecology  Labor and Delivery   Labor Progress Note      SUBJECTIVE:  Patient resting in bed. No concerns at this time.    OBJECTIVE:      Vitals:    /67   Pulse 92   Temp 98.6 °F (37 °C) (Oral)   Resp 18   Ht 5' 4\" (1.626 m)   Wt 163 lb (73.9 kg)   SpO2 100%   BMI 27.98 kg/m²         Fetal heart rate:       Baseline Heart Rate:  135        Accelerations:  present       Long Term Variability:  moderate       Decelerations:  variable         Contraction frequency: 2-5 minutes    Fetal Presentation:  Cephalic      Membranes:  Ruptured meconium stained    Cervix:           Dilation:  8 cm         Effacement:  100%         Station:  -1        ASSESSMENT & PLAN:    Continuous EFM  Reassuring fetal status  Meconium stained fluid  Pitocin @ 4 mu/min  Continue increasing pitocin infusion        DEQUAN Morrison CNM

## 2025-07-31 PROBLEM — Z34.90 PREGNANT: Status: RESOLVED | Noted: 2022-01-23 | Resolved: 2025-07-31

## 2025-07-31 PROBLEM — Z34.90 TERM PREGNANCY: Status: RESOLVED | Noted: 2022-01-24 | Resolved: 2025-07-31

## 2025-07-31 PROBLEM — Z34.90 INTRAUTERINE PREGNANCY: Status: RESOLVED | Noted: 2025-07-28 | Resolved: 2025-07-31

## 2025-07-31 LAB
CREAT SERPL-MCNC: 0.98 MG/DL (ref 0.6–1.3)
ERYTHROCYTE [DISTWIDTH] IN BLOOD BY AUTOMATED COUNT: 17.4 % (ref 11.6–14.5)
HCT VFR BLD AUTO: 24.6 % (ref 35–45)
HGB BLD-MCNC: 7.7 G/DL (ref 12–16)
MCH RBC QN AUTO: 18.2 PG (ref 24–34)
MCHC RBC AUTO-ENTMCNC: 31.3 G/DL (ref 31–37)
MCV RBC AUTO: 58.3 FL (ref 78–100)
NRBC # BLD: 0 K/UL (ref 0–0.01)
NRBC BLD-RTO: 0 PER 100 WBC
PLATELET # BLD AUTO: 168 K/UL (ref 135–420)
RBC # BLD AUTO: 4.22 M/UL (ref 4.2–5.3)
WBC # BLD AUTO: 13.4 K/UL (ref 4.6–13.2)

## 2025-07-31 PROCEDURE — 1100000000 HC RM PRIVATE

## 2025-07-31 PROCEDURE — 85027 COMPLETE CBC AUTOMATED: CPT

## 2025-07-31 PROCEDURE — 2500000003 HC RX 250 WO HCPCS: Performed by: ADVANCED PRACTICE MIDWIFE

## 2025-07-31 PROCEDURE — 6370000000 HC RX 637 (ALT 250 FOR IP): Performed by: ADVANCED PRACTICE MIDWIFE

## 2025-07-31 PROCEDURE — 6360000002 HC RX W HCPCS

## 2025-07-31 PROCEDURE — 82565 ASSAY OF CREATININE: CPT

## 2025-07-31 PROCEDURE — 36415 COLL VENOUS BLD VENIPUNCTURE: CPT

## 2025-07-31 PROCEDURE — 6360000002 HC RX W HCPCS: Performed by: ADVANCED PRACTICE MIDWIFE

## 2025-07-31 RX ADMIN — DOCUSATE SODIUM 100 MG: 100 CAPSULE, LIQUID FILLED ORAL at 20:50

## 2025-07-31 RX ADMIN — IRON SUCROSE 200 MG: 20 INJECTION, SOLUTION INTRAVENOUS at 10:01

## 2025-07-31 RX ADMIN — DOCUSATE SODIUM 100 MG: 100 CAPSULE, LIQUID FILLED ORAL at 09:53

## 2025-07-31 RX ADMIN — CLINDAMYCIN PHOSPHATE 900 MG: 900 INJECTION, SOLUTION INTRAVENOUS at 05:25

## 2025-07-31 RX ADMIN — ACETAMINOPHEN 1000 MG: 500 TABLET ORAL at 20:50

## 2025-07-31 RX ADMIN — OXYCODONE HYDROCHLORIDE 5 MG: 5 TABLET ORAL at 05:21

## 2025-07-31 RX ADMIN — LEVOTHYROXINE SODIUM 100 MCG: 0.1 TABLET ORAL at 05:21

## 2025-07-31 RX ADMIN — OXYCODONE HYDROCHLORIDE 5 MG: 5 TABLET ORAL at 18:40

## 2025-07-31 RX ADMIN — OXYCODONE HYDROCHLORIDE 10 MG: 5 TABLET ORAL at 22:29

## 2025-07-31 RX ADMIN — SODIUM CHLORIDE, PRESERVATIVE FREE 5 ML: 5 INJECTION INTRAVENOUS at 21:04

## 2025-07-31 RX ADMIN — ACETAMINOPHEN 1000 MG: 500 TABLET ORAL at 14:12

## 2025-07-31 RX ADMIN — ACETAMINOPHEN 1000 MG: 500 TABLET ORAL at 05:22

## 2025-07-31 ASSESSMENT — PAIN DESCRIPTION - ORIENTATION
ORIENTATION: LOWER

## 2025-07-31 ASSESSMENT — PAIN - FUNCTIONAL ASSESSMENT
PAIN_FUNCTIONAL_ASSESSMENT: ACTIVITIES ARE NOT PREVENTED

## 2025-07-31 ASSESSMENT — PAIN DESCRIPTION - LOCATION
LOCATION: INCISION
LOCATION: INCISION
LOCATION: ABDOMEN;BACK;INCISION
LOCATION: ABDOMEN

## 2025-07-31 ASSESSMENT — PAIN SCALES - GENERAL
PAINLEVEL_OUTOF10: 0
PAINLEVEL_OUTOF10: 5
PAINLEVEL_OUTOF10: 0
PAINLEVEL_OUTOF10: 8
PAINLEVEL_OUTOF10: 4
PAINLEVEL_OUTOF10: 8

## 2025-07-31 ASSESSMENT — PAIN DESCRIPTION - DESCRIPTORS
DESCRIPTORS: SORE
DESCRIPTORS: SORE
DESCRIPTORS: CRAMPING
DESCRIPTORS: SORE

## 2025-07-31 NOTE — PLAN OF CARE
Problem: Pain  Goal: Verbalizes/displays adequate comfort level or baseline comfort level  2025 by Mary Horton, RN  Outcome: Progressing  Flowsheets (Taken 2025)  Verbalizes/displays adequate comfort level or baseline comfort level:   Encourage patient to monitor pain and request assistance   Assess pain using appropriate pain scale     Problem: Vaginal Birth or  Section  Goal: Fetal and maternal status remain reassuring during the birth process  Description:  Birth OB-Pregnancy care plan goal which identifies if the fetal and maternal status remain reassuring during the birth process  2025 by Mary Horton, RN  Outcome: Progressing     Problem: Infection - Adult  Goal: Absence of infection at discharge  2025 by Mary Horton, RN  Outcome: Progressing     Problem: Infection - Adult  Goal: Absence of infection during hospitalization  2025 by Mary Horton, RN  Outcome: Progressing     Problem: Safety - Adult  Goal: Free from fall injury  2025 by Mary Horton, RN  Outcome: Progressing     Problem: Discharge Planning  Goal: Discharge to home or other facility with appropriate resources  2025 by Mary Horton, RN  Outcome: Progressing     Problem: Chronic Conditions and Co-morbidities  Goal: Patient's chronic conditions and co-morbidity symptoms are monitored and maintained or improved  2025 by Mary Horton, RN  Outcome: Progressing

## 2025-07-31 NOTE — PLAN OF CARE
Problem: Pain  Goal: Verbalizes/displays adequate comfort level or baseline comfort level  2025 1023 by Maria Del Rosario Westbrook RN  Outcome: Progressing  Flowsheets  Taken 2025 1023  Verbalizes/displays adequate comfort level or baseline comfort level:   Encourage patient to monitor pain and request assistance   Assess pain using appropriate pain scale  Taken 2025 0953  Verbalizes/displays adequate comfort level or baseline comfort level:   Encourage patient to monitor pain and request assistance   Assess pain using appropriate pain scale  2025 2253 by Mary Horton RN  Outcome: Progressing  Flowsheets (Taken 2025 2139)  Verbalizes/displays adequate comfort level or baseline comfort level:   Encourage patient to monitor pain and request assistance   Assess pain using appropriate pain scale     Problem: Vaginal Birth or  Section  Goal: Fetal and maternal status remain reassuring during the birth process  Description:  Birth OB-Pregnancy care plan goal which identifies if the fetal and maternal status remain reassuring during the birth process  2025 1023 by Maria Del Rosario Westbrook RN  Outcome: Progressing  2025 2253 by Mary Horton RN  Outcome: Progressing     Problem: Infection - Adult  Goal: Absence of infection at discharge  2025 1023 by Maria Del Rosario Westbrook RN  Outcome: Progressing  Flowsheets (Taken 2025 1023)  Absence of infection at discharge:   Monitor lab/diagnostic results   Assess and monitor for signs and symptoms of infection  2025 2253 by Mary Horton RN  Outcome: Progressing  Goal: Absence of infection during hospitalization  2025 1023 by Maria Del Rosario Westbrook RN  Outcome: Progressing  Flowsheets (Taken 2025 1023)  Absence of infection during hospitalization:   Monitor lab/diagnostic results   Assess and monitor for signs and symptoms of infection  2025 2253 by Mary Horton RN  Outcome: Progressing     Problem: Safety - Adult  Goal: Free

## 2025-07-31 NOTE — PROGRESS NOTES
Postpartum Progress Note    Patient: Clyde Rich MRN: [id number]  SSN: [id number]    YOB: 1985  Age: 40 y.o.  Sex: female      Subjective:     Postpartum Day: 1     Delivery:  delivery    Client states she is feeling well. Reports pain is  well controlled with current medications. The baby is in the NICU for respiratory support at this time. Clyde is ambulating well, denies dizziness, nausea, SOB, HA, vision changes or RUQ pain. She is tolerating a normal diet. States she is unsure when she would like to d/c home d/t infant in NICU.      Objective:      Patient Vitals for the past 8 hrs:   BP Temp Temp src Pulse Resp SpO2   25 0949 101/66 98.1 °F (36.7 °C) Oral 90 18 99 %   25 0521 -- -- -- -- 18 --   25 0400 111/65 98.2 °F (36.8 °C) Oral 84 18 98 %     General:    Alert, cooperative, no distress   Lochia:  appropriate    Fundus:   Firm, midline and at umbilicus   Incision:  Clean, dry and well approximated     Lab/Data Review:  Recent Results (from the past 24 hours)   Creatinine and GFR    Collection Time: 25 12:52 PM   Result Value Ref Range    Creatinine 1.38 (H) 0.60 - 1.30 MG/DL    Est, Glom Filt Rate 49 (L) >60 ml/min/1.73m2   CBC    Collection Time: 25  4:48 AM   Result Value Ref Range    WBC 13.4 (H) 4.6 - 13.2 K/uL    RBC 4.22 4.20 - 5.30 M/uL    Hemoglobin 7.7 (L) 12.0 - 16.0 g/dL    Hematocrit 24.6 (L) 35.0 - 45.0 %    MCV 58.3 (L) 78.0 - 100.0 FL    MCH 18.2 (L) 24.0 - 34.0 PG    MCHC 31.3 31.0 - 37.0 g/dL    RDW 17.4 (H) 11.6 - 14.5 %    Platelets 168 135 - 420 K/uL    Nucleated RBCs 0.0 0  WBC    nRBC 0.00 0.00 - 0.01 K/uL   Creatinine and GFR    Collection Time: 25  4:48 AM   Result Value Ref Range    Creatinine 0.98 0.60 - 1.30 MG/DL    Est, Glom Filt Rate 74 >60 ml/min/1.73m2        Assessment:     Postpartum  delivery complicated by anemia, infant in NICU   Patient Active Problem List   Diagnosis    History of

## 2025-08-01 VITALS
OXYGEN SATURATION: 100 % | TEMPERATURE: 97.6 F | RESPIRATION RATE: 18 BRPM | HEART RATE: 78 BPM | SYSTOLIC BLOOD PRESSURE: 108 MMHG | DIASTOLIC BLOOD PRESSURE: 68 MMHG

## 2025-08-01 PROBLEM — O09.529 ADVANCED MATERNAL AGE IN MULTIGRAVIDA: Status: RESOLVED | Noted: 2025-07-29 | Resolved: 2025-08-01

## 2025-08-01 PROCEDURE — 6360000002 HC RX W HCPCS

## 2025-08-01 PROCEDURE — 6370000000 HC RX 637 (ALT 250 FOR IP): Performed by: ADVANCED PRACTICE MIDWIFE

## 2025-08-01 RX ORDER — OXYCODONE HYDROCHLORIDE 5 MG/1
5 TABLET ORAL EVERY 6 HOURS PRN
Qty: 12 TABLET | Refills: 0 | Status: SHIPPED | OUTPATIENT
Start: 2025-08-01 | End: 2025-08-04

## 2025-08-01 RX ADMIN — ACETAMINOPHEN 1000 MG: 500 TABLET ORAL at 12:37

## 2025-08-01 RX ADMIN — OXYCODONE HYDROCHLORIDE 10 MG: 5 TABLET ORAL at 15:28

## 2025-08-01 RX ADMIN — IRON SUCROSE 200 MG: 20 INJECTION, SOLUTION INTRAVENOUS at 12:28

## 2025-08-01 RX ADMIN — OXYCODONE HYDROCHLORIDE 5 MG: 5 TABLET ORAL at 04:42

## 2025-08-01 RX ADMIN — OXYCODONE HYDROCHLORIDE 5 MG: 5 TABLET ORAL at 11:09

## 2025-08-01 RX ADMIN — LEVOTHYROXINE SODIUM 100 MCG: 0.1 TABLET ORAL at 07:12

## 2025-08-01 RX ADMIN — ACETAMINOPHEN 1000 MG: 500 TABLET ORAL at 04:48

## 2025-08-01 RX ADMIN — DOCUSATE SODIUM 100 MG: 100 CAPSULE, LIQUID FILLED ORAL at 11:09

## 2025-08-01 ASSESSMENT — PAIN - FUNCTIONAL ASSESSMENT
PAIN_FUNCTIONAL_ASSESSMENT: ACTIVITIES ARE NOT PREVENTED

## 2025-08-01 ASSESSMENT — PAIN SCALES - GENERAL
PAINLEVEL_OUTOF10: 5
PAINLEVEL_OUTOF10: 5
PAINLEVEL_OUTOF10: 3
PAINLEVEL_OUTOF10: 7
PAINLEVEL_OUTOF10: 5

## 2025-08-01 ASSESSMENT — PAIN DESCRIPTION - ORIENTATION
ORIENTATION: LOWER

## 2025-08-01 ASSESSMENT — PAIN DESCRIPTION - DESCRIPTORS
DESCRIPTORS: CRAMPING;ACHING
DESCRIPTORS: CRAMPING
DESCRIPTORS: ACHING;CRAMPING

## 2025-08-01 ASSESSMENT — PAIN DESCRIPTION - LOCATION
LOCATION: ABDOMEN;INCISION

## 2025-08-01 NOTE — PROGRESS NOTES
2100 - Received report from MAMTA Qiu RN, assumed care of pt at this time. Shift assessment completed. No needs at this time, call bell within reach.   0230 - At pt bedside, pt sleeping, infant stirring in bassinet next to mother.  Pt easily aroused, pt rating pain 4/10 and denies pain medicine at this time.   2315 - Report given to MITCH Abbott RN, relinquished care of pt at this time.

## 2025-08-01 NOTE — DISCHARGE SUMMARY
Obstetrical Discharge Summary     Name: Clyde Rich MRN: [id number]  SSN: [id number]    YOB: 1985  Age: 40 y.o.  Sex: female      Admit Date: 2025    Discharge Date: 2025     Admitting Physician: Cindy Martinez MD     Attending Physician:  Alhaji Ruffin MD     Admission Diagnoses: History of  delivery [Z98.891]    Discharge Diagnoses:     Delivery of a 3.365 kg  infant via , low transverse [251] on 2025 at 4:37 am.    Information for the patient's :  Carmelo Rich [[id number]]   @[phone]@     Discharge Diagnoses:   Principal Problem:    History of  delivery  Active Problems:    Beta thalassemia minor    Hypothyroidism    Postpartum care following  delivery    Lactating mother  Resolved Problems:    Advanced maternal age in multigravida       Carmelo Rich [[id number]]      Delivery Providers    Delivering clinician: Cristal Encarnacion MD   Provider Role    Cristal Encarnacion MD Obstetrician    Jayne Reyes, MARK Primary Nurse    Amanda Hartley RN Primary [place] Nurse    Christine Tomas DO Neonatologist    Denny Rascon MD Anesthesiologist    Maryjane Knott APRN - ELLIOTT Nurse Anesthetist    Cindy Alexander, MARK Scrub Tech    Gela Bonds RN Scrub Nurse                 Carmelo Rich [[id number]]      Apgars    Living status: Living  Apgars   1 Minute:  5 Minute:  10 Minute 15 Minute 20 Minute   Skin Color: 0  1       Heart Rate: 2  2       Reflex Irritability: 2  2       Muscle Tone: 2  1       Respiratory Effort: 2  2       Total: 8  8               Apgars Assigned By: DR. BRYANNA TOMAS              Labs  ABO/Rh   Date Value Ref Range Status   2025 O POSITIVE  Final     Antibody Screen   Date Value Ref Range Status   2025 NEG  Final        Immunization(s): There is no immunization history for the selected administration types on file for this patient.     * Discharge Condition: Stable    * Hospital

## 2025-08-01 NOTE — DISCHARGE INSTRUCTIONS
Nutrition for Breastfeeding: Care Instructions  Overview     Eating well during breastfeeding helps you stay healthy. Eat a variety of grains, vegetables, fruits, dairy or dairy alternatives, and protein foods. Avoid fish high in mercury. And limit alcohol and caffeine. Your doctor or midwife may suggest eating more calories each day than otherwise recommended for a person of your height and weight.  Follow-up care is a key part of your treatment and safety. Be sure to make and go to all appointments, and call your doctor if you are having problems. It's also a good idea to know your test results and keep a list of the medicines you take.  How can you care for yourself at home?  Making good choices about what you eat and drink when you are breastfeeding can help you stay healthy. It can also help your baby stay healthy. Here are some things you can do.  Eat a variety of healthy foods.  This includes vegetables, fruits, milk products, whole grains, and protein.  Drink plenty of fluids.  Make water your first choice. If you have kidney, heart, or liver disease and have to limit fluids, talk with your doctor before you increase your fluids.  Avoid fish with high mercury.  This includes shark, swordfish, ele mackerel, and marlin. It also includes orange roughy, bigeye tuna, and tilefish from the [place] of [place]. Instead, eat fish that is low in mercury. Choose canned light tuna, salmon, pollock, catfish, or shrimp.   Limit caffeine.  Things like coffee, tea, chocolate, and some sodas can contain caffeine. Caffeine can pass through breast milk to your baby. It may cause fussiness and sleep problems. Talk to your doctor about how much caffeine is safe for you.  Limit alcohol.  Alcohol can pass through breast milk to your baby. Talk to your doctor if you have questions about drinking alcohol while breastfeeding.  Be safe with supplements.  Talk with your doctor before taking any vitamins, minerals, and herbal or other  at home.  Follow-up care is a key part of your treatment and safety. Be sure to make and go to all appointments, and call your doctor if you are having problems. It's also a good idea to know your test results and keep a list of the medicines you take.  When should you call for help?  Share this information with your partner or a friend. They can help you watch for warning signs.  Call 911  anytime you think you may need emergency care. For example, call if:    You passed out (lost consciousness).     You have a seizure.     You have trouble breathing.     You have chest pain.   Call your doctor now or seek immediate medical care if:    You have symptoms of preeclampsia, such as:  Sudden swelling of your face, hands, or feet.  New vision problems (such as dimness, blurring, or seeing spots).  A severe headache.     Your blood pressure is very high, such as 160/110 or higher.     Your blood pressure is higher than your doctor told you it should be, or it rises quickly.     You have new nausea or vomiting.     You have pain in your belly or pelvis.     You gain weight rapidly.   Where can you learn more?  Go to https://www.[employer].net/patientEd and enter Q718 to learn more about \"Learning About Preeclampsia After Childbirth.\"  Current as of: 2024  Content Version: 14.5  © 6294-7068 [employer].   Care instructions adapted under license by [employer]. If you have questions about a medical condition or this instruction, always ask your healthcare professional. [employer] disclaims any warranty or liability for your use of this information.        Section: What to Expect at Home  Your Recovery    A  section, or , is surgery to deliver your baby through a cut that the doctor makes in your lower belly and uterus. The cut is called an incision.  You may have some pain in your lower belly and need pain medicine for 1 to 2 weeks. You can expect some vaginal

## 2025-08-01 NOTE — PROGRESS NOTES
Post-Operative  Day 2    Patient: Clyde Rich MRN: [id number]  SSN: [id number]    YOB: 1985  Age: 40 y.o.  Sex: female      Subjective:     Postpartum Day 2:  Delivery    The patient is feeling well and is without complaint. She is ambulating and tolerating a normal diet. Her pain is well-controlled with current medications. Her  is back in room with mother (brief admission to NICU) and is feeding without difficulty.      Objective:      All lab results for the last 24 hours reviewed.    Patient Vitals for the past 12 hrs:   Temp Pulse Resp BP SpO2   25 0649 97.6 °F (36.4 °C) 78 20 108/68 100 %   25 2235 98 °F (36.7 °C) 90 18 113/68 100 %     LABS: No results found for this or any previous visit (from the past 24 hours).    Lab Results   Component Value Date/Time    HGB 7.7 2025 04:48 AM     Lab Results   Component Value Date/Time    HCT 24.6 2025 04:48 AM      Lochia:  appropriate   Uterine Fundus:   Firm without massage   Incision Evaluation: Wound is clean, dry, well-approximated, and free of drainage. No s/s of infection noted.        Assessment:     Status post: Doing well postpartum day 2 following  section delivery.    Plan:     Continue day 2 post-c/s delivery orders. Postpartum care discussed including diet, ambulation, and actvitiy restrictions. Plan for discharge home today with routine follow-up at 6 weeks.     Signed By: DEQUAN Ryan CNM  2025  8:46 AM

## (undated) DEVICE — SOL IRRIGATION INJ NACL 0.9% 500ML BTL

## (undated) DEVICE — 3L THIN WALL CAN: Brand: CRD

## (undated) DEVICE — INTENDED FOR TISSUE SEPARATION, AND OTHER PROCEDURES THAT REQUIRE A SHARP SURGICAL BLADE TO PUNCTURE OR CUT.: Brand: BARD-PARKER ® CARBON RIB-BACK BLADES

## (undated) DEVICE — SUTURE VCRL SZ 0 L36IN ABSRB UD L36MM CT-1 1/2 CIR J946H

## (undated) DEVICE — GLOVE SURG SZ 65 THK91MIL LTX FREE SYN POLYISOPRENE

## (undated) DEVICE — LIQUIBAND RAPID ADHESIVE 36/CS 0.8ML: Brand: MEDLINE

## (undated) DEVICE — ELECTRODE PT RET AD L9FT HI MOIST COND ADH HYDRGEL CORDED

## (undated) DEVICE — 1LYRTR 16FR10ML100%SIL UMS SNP: Brand: MEDLINE INDUSTRIES, INC.

## (undated) DEVICE — SUTURE VICRYL SZ 0 L36IN ABSRB UD CT-1 L36MM 1/2 CIR TAPR PNT VCP946H

## (undated) DEVICE — BSMI CSECTION BIRTHING PACK: Brand: MEDLINE INDUSTRIES, INC.

## (undated) DEVICE — GARMENT,MEDLINE,DVT,INT,CALF,MED, GEN2: Brand: MEDLINE

## (undated) DEVICE — STRAP,POSITIONING,KNEE/BODY,FOAM,4X60": Brand: MEDLINE

## (undated) DEVICE — DERMABOND SKIN ADH 0.7ML --

## (undated) DEVICE — REM POLYHESIVE ADULT PATIENT RETURN ELECTRODE: Brand: VALLEYLAB

## (undated) DEVICE — GLOVE SURG SZ 6 THK91MIL LTX FREE SYN POLYISOPRENE ANTI

## (undated) DEVICE — SUT VCRL + 2-0 36IN CT1 UD --

## (undated) DEVICE — SUT MONOCRYL PLUS UD 4-0 --

## (undated) DEVICE — SYRINGE MED 10ML LUERLOCK TIP W/O SFTY DISP

## (undated) DEVICE — PENCIL SMK EVAC TELSCP 3 M TBNG

## (undated) DEVICE — SUTURE VICRYL + SZ 3-0 27IN ABSRB UD KS 60MM STR REV CUT NDL VCP663H

## (undated) DEVICE — HEX-LOCKING BLADE ELECTRODE: Brand: EDGE